# Patient Record
Sex: MALE | Race: WHITE | Employment: FULL TIME | URBAN - METROPOLITAN AREA
[De-identification: names, ages, dates, MRNs, and addresses within clinical notes are randomized per-mention and may not be internally consistent; named-entity substitution may affect disease eponyms.]

---

## 2018-11-18 ENCOUNTER — APPOINTMENT (OUTPATIENT)
Dept: GENERAL RADIOLOGY | Age: 48
End: 2018-11-18
Payer: COMMERCIAL

## 2018-11-18 ENCOUNTER — HOSPITAL ENCOUNTER (EMERGENCY)
Age: 48
Discharge: HOME | End: 2018-11-18
Attending: INTERNAL MEDICINE
Payer: COMMERCIAL

## 2018-11-18 ENCOUNTER — APPOINTMENT (OUTPATIENT)
Dept: ULTRASOUND IMAGING | Age: 48
End: 2018-11-18
Payer: COMMERCIAL

## 2018-11-18 VITALS
HEIGHT: 74 IN | BODY MASS INDEX: 26.31 KG/M2 | TEMPERATURE: 98.6 F | RESPIRATION RATE: 17 BRPM | SYSTOLIC BLOOD PRESSURE: 138 MMHG | DIASTOLIC BLOOD PRESSURE: 83 MMHG | WEIGHT: 205 LBS | OXYGEN SATURATION: 99 % | HEART RATE: 86 BPM

## 2018-11-18 DIAGNOSIS — I10 ESSENTIAL HYPERTENSION: ICD-10-CM

## 2018-11-18 DIAGNOSIS — M25.571 ACUTE RIGHT ANKLE PAIN: Primary | ICD-10-CM

## 2018-11-18 DIAGNOSIS — S91.309A OPEN WOUND OF HEEL, INITIAL ENCOUNTER: ICD-10-CM

## 2018-11-18 LAB — D DIMER: 0.79 MG/L FEU (ref 0–0.5)

## 2018-11-18 PROCEDURE — 93971 EXTREMITY STUDY: CPT

## 2018-11-18 PROCEDURE — 73590 X-RAY EXAM OF LOWER LEG: CPT

## 2018-11-18 PROCEDURE — 6370000000 HC RX 637 (ALT 250 FOR IP): Performed by: INTERNAL MEDICINE

## 2018-11-18 PROCEDURE — 99284 EMERGENCY DEPT VISIT MOD MDM: CPT

## 2018-11-18 PROCEDURE — 73630 X-RAY EXAM OF FOOT: CPT

## 2018-11-18 PROCEDURE — 85379 FIBRIN DEGRADATION QUANT: CPT

## 2018-11-18 RX ORDER — CEPHALEXIN 250 MG/1
250 CAPSULE ORAL 4 TIMES DAILY
Qty: 20 CAPSULE | Refills: 0 | Status: SHIPPED | OUTPATIENT
Start: 2018-11-18 | End: 2018-11-23

## 2018-11-18 RX ORDER — CEPHALEXIN 500 MG/1
500 CAPSULE ORAL ONCE
Status: COMPLETED | OUTPATIENT
Start: 2018-11-18 | End: 2018-11-18

## 2018-11-18 RX ORDER — DIAPER,BRIEF,INFANT-TODD,DISP
EACH MISCELLANEOUS ONCE
Status: COMPLETED | OUTPATIENT
Start: 2018-11-18 | End: 2018-11-18

## 2018-11-18 RX ADMIN — CEPHALEXIN 500 MG: 500 CAPSULE ORAL at 21:47

## 2018-11-18 RX ADMIN — BACITRACIN: 500 OINTMENT TOPICAL at 21:47

## 2018-11-18 ASSESSMENT — PAIN DESCRIPTION - ONSET
ONSET: ON-GOING
ONSET: ON-GOING

## 2018-11-18 ASSESSMENT — PAIN DESCRIPTION - DESCRIPTORS
DESCRIPTORS: ACHING
DESCRIPTORS: ACHING;STABBING;THROBBING

## 2018-11-18 ASSESSMENT — PAIN DESCRIPTION - ORIENTATION: ORIENTATION: RIGHT

## 2018-11-18 ASSESSMENT — PAIN DESCRIPTION - PAIN TYPE
TYPE: ACUTE PAIN
TYPE: SURGICAL PAIN

## 2018-11-18 ASSESSMENT — PAIN DESCRIPTION - FREQUENCY
FREQUENCY: CONTINUOUS
FREQUENCY: CONTINUOUS

## 2018-11-18 ASSESSMENT — PAIN DESCRIPTION - LOCATION
LOCATION: ANKLE;LEG
LOCATION: ANKLE

## 2018-11-18 ASSESSMENT — PAIN - FUNCTIONAL ASSESSMENT: PAIN_FUNCTIONAL_ASSESSMENT: 0-10

## 2018-11-18 ASSESSMENT — PAIN SCALES - GENERAL
PAINLEVEL_OUTOF10: 8
PAINLEVEL_OUTOF10: 3

## 2018-11-18 ASSESSMENT — PAIN DESCRIPTION - PROGRESSION
CLINICAL_PROGRESSION: GRADUALLY WORSENING
CLINICAL_PROGRESSION: GRADUALLY IMPROVING

## 2018-11-19 NOTE — ED PROVIDER NOTES
friction rub. No murmur heard. Pulmonary/Chest: Effort normal and breath sounds are symmetric and normal. No respiratory distress. There are no wheezes, rales or rhonchi. No tenderness is exhibited upon palpation of the chest wall. Abdominal: Soft. Bowel sounds are normal. No distension or no mass exhibitted. There is no tenderness, rebound,rigidity or guarding. Genitourinary:   No CVA tenderness noted on examination. Musculoskeletal: There is a diffuse right ankle tenderness and swelling without erythema. Limited range of motion. Lymphadenopathy:  Nocervical adenopathy. Neurological:   alert and oriented to person, place, and time. Reflexes are normal.  There are no cranial nerve deficits. Normal muscle tone, motor and sensory function exhibitted. Coordination and gait normal.   Skin: Right heel, plantar surface, incision with sutures. No surrounding erythema, edema, tenderness or drainage. Skin is warm and dry. No rash noted. No diaphoresis. No pallor. Psychiatric: Pleasant and cooperative. normal mood and affect. Behavior is  normal. Judgmentand thought content normal.     DIAGNOSTIC RESULTS     EKG: All EKG's are interpreted by the Emergency Department Physician who either signs or Co-signs this chart in the absence of a cardiologist.    Not indicated. RADIOLOGY:   Non-plain film images such as CT, Ultrasoundand MRI are read by the radiologist. Plain radiographic images are visualized and preliminarily interpreted by the emergency physician with the below findings:    Not indicated. Interpretation per the Radiologist below, if available at the time of this note:    XR TIBIA FIBULA RIGHT (2 VIEWS)    (Results Pending)   XR FOOT RIGHT (MIN 3 VIEWS)    (Results Pending)   US DUP LOWER EXTREMITY RIGHT YOLA    (Results Pending)   X-ray of the right foot area and a preliminary report was provided by stat read. There is no acute fracture. Degenerative changes.  S2. Paracervical changes about the ankle.    X-ray right tibia/fib. A preliminary report was provided by stat read. No acute fracture. Section of the distal fibula. Resection with the tibia with attempted effusion about the ankle. Collapsed irregular-appearing talus versus resection. Irregular appearance of the calcaneus. Previous fixation hardware tracks fib and the tibia. Ultrasound venous right lower extremity. Preliminary report was provided by stat read. No DVT demonstrated. ED BEDSIDE ULTRASOUND:   Performed by ED Physician - none    LABS:  Labs Reviewed   D-DIMER, QUANTITATIVE - Abnormal; Notable for the following:        Result Value    D-Dimer, Quant 0.79 (*)     All other components within normal limits    Narrative:     Theresa Shelley tel. [phone],  Coag results called to and read back by sangita lakhani 11/18/2018 20:20, by Griselda Breaker       All other labs were within normal range or not returned as of this dictation. EMERGENCY DEPARTMENT COURSE and DIFFERENTIAL DIAGNOSIS/MDM:   Vitals:    Vitals:    11/18/18 1946 11/18/18 2134   BP: (!) 163/100 138/83   Pulse: 77 86   Resp: 18 17   Temp: 98.1 °F (36.7 °C) 98.6 °F (37 °C)   TempSrc: Oral Oral   SpO2: 100% 99%   Weight: 205 lb (93 kg)    Height: 6' 2\" (1.88 m)        Noted    MDM    CRITICAL CARE TIME   Total Critical Care time was 0 minutes. EDCOURSE       CONSULTS:  None    PROCEDURES:  Unlessotherwise noted below, none     Procedures      Summation    Patient with right ankle pain after feeling a pop. There is no evidence of hardware failure. Concerned for deformity about the talar joint which may be postsurgical. Patient instructed to use crutches for no weight bearing status and to follow-up with his orthopedic doctor.       Patient Course:        ED Medicationsadministered this visit:    Medications   cephALEXin (KEFLEX) capsule 500 mg (500 mg Oral Given 11/18/18 2147)   bacitracin zinc ointment ( Topical Given 11/18/18 2147)       New Prescriptions from this visit: Discharge Medication List as of 11/18/2018  9:41 PM      START taking these medications    Details   cephALEXin (KEFLEX) 250 MG capsule Take 1 capsule by mouth 4 times daily for 5 days, Disp-20 capsule, R-0Print             Follow-up:    ORTHOPEDICS in 1 day            Final Impression:   1. Acute right ankle pain    2. Essential hypertension    3. Open wound of heel, initial encounter               (Please note that portions of this note werecompleted with a voice recognition program.  Efforts were made to edit the dictations but occasionally words are mis-transcribed.)    FINAL IMPRESSION      1. Acute right ankle pain    2. Essential hypertension    3.  Open wound of heel, initial encounter          DISPOSITION/PLAN   DISPOSITION Decision To Discharge 11/18/2018 09:36:42 PM      PATIENT REFERRED TO:    ORTHOPEDICS in 1 day          DISCHARGE MEDICATIONS:  Discharge Medication List as of 11/18/2018  9:41 PM      START taking these medications    Details   cephALEXin (KEFLEX) 250 MG capsule Take 1 capsule by mouth 4 times daily for 5 days, Disp-20 capsule, R-0Print                (Please note that portions of this note were completed with a voice recognition program.  Efforts were made to edit the dictations but occasionally words are mis-transcribed.)    Varun Estes MD (electronically signed)  Attending Emergency Physician            Varun Estes MD  11/19/18 4172

## 2019-04-30 ENCOUNTER — OFFICE VISIT (OUTPATIENT)
Dept: FAMILY MEDICINE CLINIC | Age: 49
End: 2019-04-30
Payer: COMMERCIAL

## 2019-04-30 ENCOUNTER — HOSPITAL ENCOUNTER (OUTPATIENT)
Dept: GENERAL RADIOLOGY | Age: 49
Discharge: HOME | End: 2019-05-02
Payer: COMMERCIAL

## 2019-04-30 ENCOUNTER — HOSPITAL ENCOUNTER (OUTPATIENT)
Age: 49
Discharge: HOME | End: 2019-05-02
Payer: COMMERCIAL

## 2019-04-30 VITALS
BODY MASS INDEX: 26.69 KG/M2 | HEART RATE: 96 BPM | WEIGHT: 208 LBS | DIASTOLIC BLOOD PRESSURE: 86 MMHG | TEMPERATURE: 97 F | OXYGEN SATURATION: 98 % | HEIGHT: 74 IN | SYSTOLIC BLOOD PRESSURE: 136 MMHG | RESPIRATION RATE: 18 BRPM

## 2019-04-30 DIAGNOSIS — R05.3 CHRONIC COUGH: Primary | ICD-10-CM

## 2019-04-30 DIAGNOSIS — R05.3 CHRONIC COUGH: ICD-10-CM

## 2019-04-30 PROCEDURE — 71046 X-RAY EXAM CHEST 2 VIEWS: CPT

## 2019-04-30 PROCEDURE — 99213 OFFICE O/P EST LOW 20 MIN: CPT | Performed by: PHYSICIAN ASSISTANT

## 2019-04-30 RX ORDER — MONTELUKAST SODIUM 10 MG/1
10 TABLET ORAL NIGHTLY
Qty: 30 TABLET | Refills: 2 | Status: SHIPPED | OUTPATIENT
Start: 2019-04-30 | End: 2020-01-22

## 2019-04-30 RX ORDER — BENZONATATE 200 MG/1
200 CAPSULE ORAL 3 TIMES DAILY PRN
Qty: 30 CAPSULE | Refills: 2 | Status: SHIPPED | OUTPATIENT
Start: 2019-04-30 | End: 2019-05-07

## 2019-04-30 ASSESSMENT — PATIENT HEALTH QUESTIONNAIRE - PHQ9
1. LITTLE INTEREST OR PLEASURE IN DOING THINGS: 0
SUM OF ALL RESPONSES TO PHQ QUESTIONS 1-9: 0
SUM OF ALL RESPONSES TO PHQ9 QUESTIONS 1 & 2: 0
2. FEELING DOWN, DEPRESSED OR HOPELESS: 0
SUM OF ALL RESPONSES TO PHQ QUESTIONS 1-9: 0

## 2019-04-30 ASSESSMENT — ENCOUNTER SYMPTOMS
HEMOPTYSIS: 0
WHEEZING: 0
SHORTNESS OF BREATH: 0
HEARTBURN: 0
SORE THROAT: 0
RHINORRHEA: 0
COUGH: 1

## 2019-04-30 NOTE — PROGRESS NOTES
Subjective     D Tonie Boyd [facility] 46 y.o. male presents 4/30/19 with   Chief Complaint   Patient presents with    Cough     C/o cough 9x week. Patient tried cough drops with some relief        Cough   This is a new problem. The current episode started more than 1 month ago (x 9 months). The problem occurs constantly. The cough is non-productive. Pertinent negatives include no chest pain, chills, ear congestion, ear pain, fever, headaches, heartburn, hemoptysis, myalgias, nasal congestion, postnasal drip, rash, rhinorrhea, sore throat, shortness of breath, sweats, weight loss or wheezing. Nothing aggravates the symptoms. Treatments tried: some allergy pills, cough drops. The treatment provided no relief. There is no history of asthma, bronchiectasis, bronchitis, COPD, emphysema, environmental allergies or pneumonia. Cough began after surgery (fractured leg), suspected it was from the intubation but it has been 9 weeks and no improvement. Reviewed thefollowing history:    Past Medical History:   Diagnosis Date    Allergic rhinitis     Cancer ([address] 2/12    prostate    Hyperlipidemia     borderline    PVC (premature ventricular contraction)     benign     Past Surgical History:   Procedure Laterality Date    CYST REMOVAL  2000    buttocks    FINGER SURGERY      left thumb, [employer]ers    KNEE SURGERY      left, meniscal    PROSTATECTOMY  2/12    cancer    TONSILLECTOMY  age 8     Family History   Problem Relation Age of Onset    Cancer Father         prostate    Cancer Paternal Grandfather         prostate       Allergies   Allergen Reactions    Varenicline      Other reaction(s):  Intolerance  PVCs, dysrrhythmia       Current Outpatient Medications   Medication Sig Dispense Refill    montelukast (SINGULAIR) 10 MG tablet Take 1 tablet by mouth nightly 30 tablet 2    benzonatate (TESSALON) 200 MG capsule Take 1 capsule by mouth 3 times daily as needed for Cough 30 capsule 2     No current facility-administered medications for this visit. Review of Systems   Constitutional: Negative for chills, fever and weight loss. HENT: Negative for ear pain, postnasal drip, rhinorrhea and sore throat. Respiratory: Positive for cough. Negative for hemoptysis, shortness of breath and wheezing. Cardiovascular: Negative for chest pain. Gastrointestinal: Negative for heartburn. Musculoskeletal: Negative for myalgias. Skin: Negative for rash. Allergic/Immunologic: Negative for environmental allergies. Neurological: Negative for headaches. Objective    Vitals:    04/30/19 1408   BP: 136/86   Site: Left Upper Arm   Position: Sitting   Cuff Size: Large Adult   Pulse: 96   Resp: 18   Temp: 97 °F (36.1 °C)   TempSrc: Temporal   SpO2: 98%   Weight: 208 lb (94.3 kg)   Height: 6' 2\" (1.88 m)       Physical Exam   Constitutional: He appears well-developed and well-nourished. No distress. HENT:   Right Ear: External ear normal.   Left Ear: External ear normal.   Mouth/Throat: No oropharyngeal exudate. Eyes: No scleral icterus. Cardiovascular: Normal rate and regular rhythm. Pulmonary/Chest: Effort normal and breath sounds normal. No stridor. No respiratory distress. He has no wheezes. He has no rales. He exhibits no tenderness. Lymphadenopathy:     He has no cervical adenopathy. Skin: He is not diaphoretic. No erythema. No pallor. Vitals reviewed. Assessment and Plan      ICD-10-CM    1.  Chronic cough R05 XR CHEST (SINGLE VIEW FRONTAL)     Referral To External ENT Wilber Davis MD       Orders Placed This Encounter   Procedures    XR CHEST (SINGLE VIEW FRONTAL)     Standing Status:   Future     Standing Expiration Date:   4/30/2020     Order Specific Question:   Reason for exam:     Answer:   cough x 9 weeks    Referral To External ENT Wilber Davis MD     Referral Priority:   Routine     Referral Type:   Eval and Treat     Referral Reason:   Specialty Services Required Referred to Provider:   Marco A Zhao MD     Requested Specialty:   Otolaryngology     Number of Visits Requested:   1       Orders Placed This Encounter   Medications    montelukast (SINGULAIR) 10 MG tablet     Sig: Take 1 tablet by mouth nightly     Dispense:  30 tablet     Refill:  2    benzonatate (TESSALON) 200 MG capsule     Sig: Take 1 capsule by mouth 3 times daily as needed for Cough     Dispense:  30 capsule     Refill:  2       Reviewed with the patient: current clinicalstatus, medications, activities and diet. Side effects, adverse effects of the medication prescribed today, as well as treatment plan and result expectations have been discussed with the patient who expressesunderstanding and desires to proceed. Close follow up to evaluate treatment results and for coordination of care. I have reviewed the patient's medical history in detail and updated the computerized patientrecord. Return if symptoms worsen or fail to improve, for follow up with PCP.     VIOLETA Conner

## 2019-06-12 ENCOUNTER — HOSPITAL ENCOUNTER (OUTPATIENT)
Age: 49
Discharge: HOME | End: 2019-06-14
Payer: COMMERCIAL

## 2019-06-12 ENCOUNTER — HOSPITAL ENCOUNTER (OUTPATIENT)
Dept: GENERAL RADIOLOGY | Age: 49
Discharge: HOME | End: 2019-06-14
Payer: COMMERCIAL

## 2019-06-12 DIAGNOSIS — R52 PAIN: ICD-10-CM

## 2019-06-12 DIAGNOSIS — M79.661 PAIN AND SWELLING OF RIGHT LOWER LEG: ICD-10-CM

## 2019-06-12 DIAGNOSIS — M79.89 PAIN AND SWELLING OF RIGHT LOWER LEG: ICD-10-CM

## 2019-06-12 PROCEDURE — 73590 X-RAY EXAM OF LOWER LEG: CPT

## 2019-09-18 ENCOUNTER — HOSPITAL ENCOUNTER (OUTPATIENT)
Dept: ULTRASOUND IMAGING | Age: 49
Discharge: HOME | End: 2019-09-20
Payer: COMMERCIAL

## 2019-09-18 DIAGNOSIS — M79.661 PAIN IN RIGHT LOWER LEG: ICD-10-CM

## 2019-09-18 PROCEDURE — 93926 LOWER EXTREMITY STUDY: CPT

## 2019-10-01 ENCOUNTER — HOSPITAL ENCOUNTER (OUTPATIENT)
Dept: LAB | Age: 49
Discharge: HOME | End: 2019-10-01
Payer: COMMERCIAL

## 2019-10-01 LAB
ANION GAP SERPL CALCULATED.3IONS-SCNC: 14 MEQ/L (ref 9–15)
BASOPHILS ABSOLUTE: 0.1 K/UL (ref 0–0.2)
BASOPHILS RELATIVE PERCENT: 1.2 %
BUN BLDV-MCNC: 13 MG/DL (ref 6–20)
C-REACTIVE PROTEIN: 20.2 MG/L (ref 0–5)
CALCIUM SERPL-MCNC: 9.5 MG/DL (ref 8.5–9.9)
CHLORIDE BLD-SCNC: 103 MEQ/L (ref 95–107)
CO2: 24 MEQ/L (ref 20–31)
CREAT SERPL-MCNC: 1.24 MG/DL (ref 0.7–1.2)
EOSINOPHILS ABSOLUTE: 0.1 K/UL (ref 0–0.7)
EOSINOPHILS RELATIVE PERCENT: 2.9 %
GFR AFRICAN AMERICAN: >60
GFR NON-AFRICAN AMERICAN: >60
GLUCOSE BLD-MCNC: 99 MG/DL (ref 70–99)
HCT VFR BLD CALC: 34.9 % (ref 42–52)
HEMOGLOBIN: 11.1 G/DL (ref 14–18)
LYMPHOCYTES ABSOLUTE: 1.6 K/UL (ref 1–4.8)
LYMPHOCYTES RELATIVE PERCENT: 32.5 %
MCH RBC QN AUTO: 26 PG (ref 27–31.3)
MCHC RBC AUTO-ENTMCNC: 31.9 % (ref 33–37)
MCV RBC AUTO: 81.5 FL (ref 80–100)
MONOCYTES ABSOLUTE: 0.6 K/UL (ref 0.2–0.8)
MONOCYTES RELATIVE PERCENT: 11.6 %
NEUTROPHILS ABSOLUTE: 2.5 K/UL (ref 1.4–6.5)
NEUTROPHILS RELATIVE PERCENT: 51.8 %
PDW BLD-RTO: 15.6 % (ref 11.5–14.5)
PLATELET # BLD: 466 K/UL (ref 130–400)
POTASSIUM SERPL-SCNC: 4.3 MEQ/L (ref 3.4–4.9)
RBC # BLD: 4.28 M/UL (ref 4.7–6.1)
SEDIMENTATION RATE, ERYTHROCYTE: 86 MM (ref 0–20)
SODIUM BLD-SCNC: 141 MEQ/L (ref 135–144)
WBC # BLD: 4.8 K/UL (ref 4.8–10.8)

## 2019-10-01 PROCEDURE — 80048 BASIC METABOLIC PNL TOTAL CA: CPT

## 2019-10-01 PROCEDURE — 85025 COMPLETE CBC W/AUTO DIFF WBC: CPT

## 2019-10-01 PROCEDURE — 85652 RBC SED RATE AUTOMATED: CPT

## 2019-10-01 PROCEDURE — 86140 C-REACTIVE PROTEIN: CPT

## 2019-10-01 PROCEDURE — 36415 COLL VENOUS BLD VENIPUNCTURE: CPT

## 2020-01-10 ENCOUNTER — HOSPITAL ENCOUNTER (OUTPATIENT)
Dept: LAB | Age: 50
Discharge: HOME | End: 2020-01-10
Payer: COMMERCIAL

## 2020-01-10 ENCOUNTER — HOSPITAL ENCOUNTER (OUTPATIENT)
Dept: WOUND CARE | Age: 50
Discharge: HOME | End: 2020-01-10
Payer: COMMERCIAL

## 2020-01-10 VITALS
RESPIRATION RATE: 16 BRPM | SYSTOLIC BLOOD PRESSURE: 123 MMHG | DIASTOLIC BLOOD PRESSURE: 83 MMHG | TEMPERATURE: 98 F | HEART RATE: 75 BPM

## 2020-01-10 PROBLEM — T81.329A DEHISCENCE OF INTERNAL SURGICAL INCISION, INITIAL ENCOUNTER: Status: ACTIVE | Noted: 2020-01-10

## 2020-01-10 PROBLEM — L02.419 CUTANEOUS ABSCESS OF EXTREMITY: Chronic | Status: ACTIVE | Noted: 2020-01-10

## 2020-01-10 PROBLEM — T81.32XA DEHISCENCE OF INTERNAL SURGICAL INCISION, INITIAL ENCOUNTER: Status: ACTIVE | Noted: 2020-01-10

## 2020-01-10 LAB
BASOPHILS ABSOLUTE: 0 K/UL (ref 0–0.2)
BASOPHILS RELATIVE PERCENT: 0.6 %
C-REACTIVE PROTEIN, HIGH SENSITIVITY: 37.7 MG/L (ref 0–5)
EOSINOPHILS ABSOLUTE: 0.2 K/UL (ref 0–0.7)
EOSINOPHILS RELATIVE PERCENT: 4 %
HCT VFR BLD CALC: 37.3 % (ref 42–52)
HEMOGLOBIN: 12.2 G/DL (ref 14–18)
LYMPHOCYTES ABSOLUTE: 1.6 K/UL (ref 1–4.8)
LYMPHOCYTES RELATIVE PERCENT: 36.2 %
MCH RBC QN AUTO: 26.9 PG (ref 27–31.3)
MCHC RBC AUTO-ENTMCNC: 32.7 % (ref 33–37)
MCV RBC AUTO: 82.1 FL (ref 80–100)
MONOCYTES ABSOLUTE: 0.3 K/UL (ref 0.2–0.8)
MONOCYTES RELATIVE PERCENT: 7.7 %
NEUTROPHILS ABSOLUTE: 2.3 K/UL (ref 1.4–6.5)
NEUTROPHILS RELATIVE PERCENT: 51.5 %
PDW BLD-RTO: 17.5 % (ref 11.5–14.5)
PLATELET # BLD: 494 K/UL (ref 130–400)
RBC # BLD: 4.55 M/UL (ref 4.7–6.1)
SEDIMENTATION RATE, ERYTHROCYTE: 85 MM (ref 0–20)
VITAMIN D 25-HYDROXY: 38.7 NG/ML (ref 30–100)
WBC # BLD: 4.5 K/UL (ref 4.8–10.8)

## 2020-01-10 PROCEDURE — 85652 RBC SED RATE AUTOMATED: CPT

## 2020-01-10 PROCEDURE — 36415 COLL VENOUS BLD VENIPUNCTURE: CPT

## 2020-01-10 PROCEDURE — 86141 C-REACTIVE PROTEIN HS: CPT

## 2020-01-10 PROCEDURE — 99212 OFFICE O/P EST SF 10 MIN: CPT

## 2020-01-10 PROCEDURE — 82306 VITAMIN D 25 HYDROXY: CPT

## 2020-01-10 PROCEDURE — 10060 I&D ABSCESS SIMPLE/SINGLE: CPT

## 2020-01-10 PROCEDURE — 85025 COMPLETE CBC W/AUTO DIFF WBC: CPT

## 2020-01-10 NOTE — PROGRESS NOTES
to Encounter   Medication Sig Dispense Refill    montelukast (SINGULAIR) 10 MG tablet Take 1 tablet by mouth nightly 30 tablet 2     No current facility-administered medications on file prior to encounter. REVIEW OF SYSTEMS    Pertinent items are noted in HPI. Objective:      /83   Pulse 75   Temp 98 °F (36.7 °C) (Temporal)   Resp 16     Wt Readings from Last 3 Encounters:   04/30/19 208 lb (94.3 kg)   11/18/18 205 lb (93 kg)   01/27/16 195 lb (88.5 kg)       PHYSICAL EXAM    Constitutional:   Well nourished and well developed. Appears neat and clean. Patient is alert, oriented x3, and in no apparent distress. Respiratory:  Respiratory effort is easy and symmetric bilaterally. Rate is normal at rest and on room air. Vascular:  Pedal Pulses is palpable and audible signal noted with doppler. Capillary refill is <5 sec to digits bilateral.  Extremities negative for pitting edema. Neurological:  Gross and Light touch intact. Protective sensation intact    Dermatological:  Wound description noted in wound assessment. Medial ankle patient has a fluctuant bulla with bloody purulent drainage noted. The medial wound tunnels approximately 2 centimeters. No draiange noted from this    Psychiatric:  Judgement and insight intact. Short and long term memory intact. No evidence of depression, anxiety, or agitation. Patient is calm, cooperative, and communicative. Appropriate interactions and affect.       Assessment:      Problem List Items Addressed This Visit     Cutaneous abscess of extremity (Chronic)    Dehiscence of internal surgical incision, initial encounter      Other Visit Diagnoses     Other acute osteomyelitis of right ankle ([facility] [address]    -  Primary    Relevant Orders    CBC auto differential    Sedimentation Rate    High Sensitivity Crp           Procedure Note  Indications:  Based on my examination of this patient's wound(s)/ulcer(s) today, debridement is required to promote healing and evaluate the wound base. Wound 01/10/20 Ankle Right;Lateral #1 (Active)   Wound Image   1/10/2020 11:13 AM   Wound Non-Healing Surgical 1/10/2020 11:13 AM   Offloading for Diabetic Foot Ulcers Offloading boot 1/10/2020 12:06 PM   Wound Cleansed Rinsed/Irrigated with saline 1/10/2020 11:13 AM   Wound Length (cm) 0.9 cm 1/10/2020 11:13 AM   Wound Width (cm) 0.9 cm 1/10/2020 11:13 AM   Wound Depth (cm) 1.5 cm 1/10/2020 11:13 AM   Wound Surface Area (cm^2) 0.81 cm^2 1/10/2020 11:13 AM   Wound Volume (cm^3) 1.22 cm^3 1/10/2020 11:13 AM   Post-Procedure Length (cm) 0.9 cm 1/10/2020 11:40 AM   Post-Procedure Width (cm) 0.9 cm 1/10/2020 11:40 AM   Post-Procedure Depth (cm) 2 cm 1/10/2020 11:40 AM   Post-Procedure Surface Area (cm^2) 0.81 cm^2 1/10/2020 11:40 AM   Post-Procedure Volume (cm^3) 1.62 cm^3 1/10/2020 11:40 AM   Drainage Amount Moderate 1/10/2020 11:40 AM   Drainage Description Serosanguinous 1/10/2020 11:40 AM   Odor None 1/10/2020 11:13 AM   Margins Defined edges 1/10/2020 11:13 AM   Maria Esther-wound Assessment Dry; Intact 1/10/2020 11:13 AM   Non-staged Wound Description Full thickness 1/10/2020 11:13 AM   Red%Wound Bed 35 1/10/2020 11:13 AM   Yellow%Wound Bed 65 1/10/2020 11:13 AM   Number of days: 0       Wound 01/10/20 Ankle Right;Medial #2 (Active)   Wound Image   1/10/2020 11:13 AM   Wound Other 1/10/2020 11:13 AM   Wound Cleansed Rinsed/Irrigated with saline 1/10/2020 11:13 AM   Post-Procedure Length (cm) 3 cm 1/10/2020 11:40 AM   Post-Procedure Width (cm) 1.2 cm 1/10/2020 11:40 AM   Post-Procedure Depth (cm) 1.5 cm 1/10/2020 11:40 AM   Post-Procedure Surface Area (cm^2) 3.6 cm^2 1/10/2020 11:40 AM   Post-Procedure Volume (cm^3) 5.4 cm^3 1/10/2020 11:40 AM   Drainage Amount Moderate 1/10/2020 11:40 AM   Drainage Description Serosanguinous;Purulent 1/10/2020 11:40 AM   Odor None 1/10/2020 11:13 AM   Margins Defined edges 1/10/2020 11:13 AM   Maria Esther-wound Assessment Dry; Intact 1/10/2020 11:13 AM   Non-staged Wound Description Full thickness 1/10/2020 11:13 AM   [place]%Wound Bed 5 1/10/2020 11:13 AM   Yellow%Wound Bed 25 1/10/2020 11:13 AM   Purple%Wound Bed 70 1/10/2020 11:13 AM   Number of days: 0         Incision and Drainage Note    Type of Incision and Drainage:      Hematoma/Seroma    Performed by: Angelina Macias DPM    Consent obtained: Yes    Time out taken: Yes    Pain Control:       Drainage Type: moderate, thick, bloody, purulent    Instruments: tissue nippers    The wound/ulcer was sharply debrided    down through and included excision of  epidermis, dermis and subcutaneous tissue. Devitalized Tissue Debrided:  necrotic/eschar    Location of Incision and Drainage:    Wound/Ulcer #: 1    Incision and Drainage Size cm  Wound 01/10/20 Ankle Right;Lateral #1 (Active)   Wound Image   1/10/2020 11:13 AM   Wound Non-Healing Surgical 1/10/2020 11:13 AM   Offloading for Diabetic Foot Ulcers Offloading boot 1/10/2020 12:06 PM   Wound Cleansed Rinsed/Irrigated with saline 1/10/2020 11:13 AM   Wound Length (cm) 0.9 cm 1/10/2020 11:13 AM   Wound Width (cm) 0.9 cm 1/10/2020 11:13 AM   Wound Depth (cm) 1.5 cm 1/10/2020 11:13 AM   Wound Surface Area (cm^2) 0.81 cm^2 1/10/2020 11:13 AM   Wound Volume (cm^3) 1.22 cm^3 1/10/2020 11:13 AM   Post-Procedure Length (cm) 0.9 cm 1/10/2020 11:40 AM   Post-Procedure Width (cm) 0.9 cm 1/10/2020 11:40 AM   Post-Procedure Depth (cm) 2 cm 1/10/2020 11:40 AM   Post-Procedure Surface Area (cm^2) 0.81 cm^2 1/10/2020 11:40 AM   Post-Procedure Volume (cm^3) 1.62 cm^3 1/10/2020 11:40 AM   Drainage Amount Moderate 1/10/2020 11:40 AM   Drainage Description Serosanguinous 1/10/2020 11:40 AM   Odor None 1/10/2020 11:13 AM   Margins Defined edges 1/10/2020 11:13 AM   Maria Esther-wound Assessment Dry; Intact 1/10/2020 11:13 AM   Non-staged Wound Description Full thickness 1/10/2020 11:13 AM   Red%Wound Bed 35 1/10/2020 11:13 AM   Yellow%Wound Bed 65 1/10/2020 11:13 AM   Number of days: 0       Wound 01/10/20 Ankle Right;Medial #2 (Active)   Wound Image   1/10/2020 11:13 AM   Wound Other 1/10/2020 11:13 AM   Wound Cleansed Rinsed/Irrigated with saline 1/10/2020 11:13 AM   Post-Procedure Length (cm) 3 cm 1/10/2020 11:40 AM   Post-Procedure Width (cm) 1.2 cm 1/10/2020 11:40 AM   Post-Procedure Depth (cm) 1.5 cm 1/10/2020 11:40 AM   Post-Procedure Surface Area (cm^2) 3.6 cm^2 1/10/2020 11:40 AM   Post-Procedure Volume (cm^3) 5.4 cm^3 1/10/2020 11:40 AM   Drainage Amount Moderate 1/10/2020 11:40 AM   Drainage Description Serosanguinous;Purulent 1/10/2020 11:40 AM   Odor None 1/10/2020 11:13 AM   Margins Defined edges 1/10/2020 11:13 AM   Maria Esther-wound Assessment Dry; Intact 1/10/2020 11:13 AM   Non-staged Wound Description Full thickness 1/10/2020 11:13 AM   [place]%Wound Bed 5 1/10/2020 11:13 AM   Yellow%Wound Bed 25 1/10/2020 11:13 AM   Purple%Wound Bed 70 1/10/2020 11:13 AM   Number of days: 0          Wound size pre: 3.6    Wound size post: 3.6     Culture sent: Yes     Bleeding:with a small amount of bleeding    Hemostasis Achieved: by pressure    Procedural Pain: 2  / 10     Post Procedural Pain: 4 / 10     Response to treatment: Well tolerated by patient. Plan:   Patient examined  I&D of absecess performed  Opticell AG rope packing  Blood work CBC VIT D ESR CRP  Follow up in one week  Culture taken  Discussed possible  IV abx        Treatment Note please see attached Discharge Instructions    Written patient dismissal instructions given to patient and signed by patient or POA.          Discharge [address] and [facility]   Physician Orders and Discharge Instructions  [address]  Telephone: [phone]      [phone]      NAME:  JOVANA Morales  YOB: 1970  MEDICAL RECORD NUMBER:  [id number]    Home Care/Facility:   NONE    Wound Location:   RIGHT LATERAL ANKLE AND RIGHT

## 2020-01-14 RX ORDER — CEPHALEXIN 500 MG/1
500 CAPSULE ORAL 4 TIMES DAILY
Qty: 40 CAPSULE | Refills: 0 | Status: SHIPPED | OUTPATIENT
Start: 2020-01-14 | End: 2020-01-24

## 2020-01-17 ENCOUNTER — HOSPITAL ENCOUNTER (OUTPATIENT)
Dept: WOUND CARE | Age: 50
Discharge: HOME | End: 2020-01-17
Payer: COMMERCIAL

## 2020-01-17 VITALS
RESPIRATION RATE: 16 BRPM | DIASTOLIC BLOOD PRESSURE: 84 MMHG | BODY MASS INDEX: 27.6 KG/M2 | TEMPERATURE: 97.7 F | WEIGHT: 215 LBS | HEART RATE: 84 BPM | SYSTOLIC BLOOD PRESSURE: 123 MMHG

## 2020-01-17 PROCEDURE — 99213 OFFICE O/P EST LOW 20 MIN: CPT

## 2020-01-17 ASSESSMENT — PAIN DESCRIPTION - PAIN TYPE: TYPE: CHRONIC PAIN

## 2020-01-17 ASSESSMENT — PAIN DESCRIPTION - FREQUENCY: FREQUENCY: CONTINUOUS

## 2020-01-17 ASSESSMENT — PAIN DESCRIPTION - PROGRESSION: CLINICAL_PROGRESSION: NOT CHANGED

## 2020-01-17 ASSESSMENT — PAIN DESCRIPTION - ORIENTATION: ORIENTATION: RIGHT

## 2020-01-17 ASSESSMENT — PAIN DESCRIPTION - LOCATION: LOCATION: ANKLE

## 2020-01-17 ASSESSMENT — PAIN DESCRIPTION - DESCRIPTORS: DESCRIPTORS: DULL

## 2020-01-17 ASSESSMENT — PAIN SCALES - GENERAL: PAINLEVEL_OUTOF10: 6

## 2020-01-17 ASSESSMENT — PAIN - FUNCTIONAL ASSESSMENT: PAIN_FUNCTIONAL_ASSESSMENT: ACTIVITIES ARE NOT PREVENTED

## 2020-01-17 ASSESSMENT — PAIN DESCRIPTION - ONSET: ONSET: ON-GOING

## 2020-01-17 NOTE — PROGRESS NOTES
MEDICATIONS    Current Outpatient Medications on File Prior to Encounter   Medication Sig Dispense Refill    cephALEXin (KEFLEX) 500 MG capsule Take 1 capsule by mouth 4 times daily 40 capsule 0    SODIUM CHLORIDE, EXTERNAL, 0.9 % SOLN Apply 1 Applicatorful topically daily 1000 mL 2    montelukast (SINGULAIR) 10 MG tablet Take 1 tablet by mouth nightly 30 tablet 2     No current facility-administered medications on file prior to encounter. REVIEW OF SYSTEMS    Pertinent items are noted in HPI. Objective:      /84   Pulse 84   Temp 97.7 °F (36.5 °C) (Temporal)   Resp 16   Wt 215 lb (97.5 kg)   BMI 27.60 kg/m²     Wt Readings from Last 3 Encounters:   01/17/20 215 lb (97.5 kg)   04/30/19 208 lb (94.3 kg)   11/18/18 205 lb (93 kg)       PHYSICAL EXAM    Constitutional:   Well nourished and well developed. Appears neat and clean. Patient is alert, oriented x3, and in no apparent distress. Respiratory:  Respiratory effort is easy and symmetric bilaterally. Rate is normal at rest and on room air. Vascular:  Pedal Pulses is palpable and audible signal noted with doppler. Capillary refill is <5 sec to digits bilateral.  Extremities negative for pitting edema. Neurological:  Gross and Light touch intact. Protective sensation intact    Dermatological:  Wound description noted in wound assessment. Medial ankle The medial wound is improved with healthy granular base and decreased depth as well. No purulenced noted. Edema warmth and redness is improved. Latera wound tunnels approximately 0.7 centimeters. No draiange noted from this area. Psychiatric:  Judgement and insight intact. Short and long term memory intact. No evidence of depression, anxiety, or agitation. Patient is calm, cooperative, and communicative. Appropriate interactions and affect.       Assessment:      Problem List Items Addressed This Visit     None           Procedure Note  Indications:  Based on my Cover with 4x4's and wrap with gauze (francisco j or kerlix)  4. Change EVERY DAY.     Compression:     Offloading Device:   PATIENT HAS AN OFFLOADING BOOT TO WEAR ON RIGHT LOWER LEG.    Other Instructions:  Keep appointment with Infectious Disease.     Keep all dressings clean, dry and intact. Keep pressure off the wound(s) at all times.      Follow up visit   1 Week  January 24, 2020 at  8:45am     Please give 24 hour notice if unable to keep appointment. [phone]     If you experience any of the following, please call the Wound Care Service at  [phone] or go to the nearest emergency room. *Increase in pain         *Temperature over 101           *Increase in drainage from your wound or a foul odor  *Uncontrolled swelling            *Need for compression bandage changes due to slippage, breakthrough drainage       PLEASE NOTE: IF YOU ARE UNABLE TO OBTAIN WOUND SUPPLIES, CONTINUE TO USE THE SUPPLIES YOU HAVE AVAILABLE UNTIL YOU ARE ABLE TO REACH US.  IT IS MOST IMPORTANT TO KEEP THE WOUND COVERED AT ALL TIMES  Electronically signed by Ayana Ware DPM on 1/17/2020 at 9:47 AM          Electronically signed by Ayana Ware DPM on 1/17/2020 at 10:00 AM

## 2020-01-17 NOTE — CODE DOCUMENTATION
[id number] [facility] Phys[facility] Billing Sheet. JOVANA Melendez  AGE: 44 y.o.    GENDER: male  : 1970  TODAY'S DATE:  2020    ICD-10 CODES  Active Hospital Problems    Diagnosis Date Noted    Dehiscence of internal surgical incision, initial encounter Jasson Sadia 01/10/2020    Cutaneous abscess of extremity [L02.419] 01/10/2020       PHYSICIAN PROCEDURES    CPT CODE  06279      Electronically signed by Kimo Mi DPM on 2020 at 10:03 AM

## 2020-01-22 ENCOUNTER — OFFICE VISIT (OUTPATIENT)
Dept: INFECTIOUS DISEASES | Age: 50
End: 2020-01-22
Payer: COMMERCIAL

## 2020-01-22 VITALS
HEART RATE: 96 BPM | TEMPERATURE: 98.2 F | BODY MASS INDEX: 27.08 KG/M2 | DIASTOLIC BLOOD PRESSURE: 87 MMHG | RESPIRATION RATE: 18 BRPM | HEIGHT: 74 IN | SYSTOLIC BLOOD PRESSURE: 117 MMHG | WEIGHT: 211 LBS

## 2020-01-22 PROCEDURE — G8484 FLU IMMUNIZE NO ADMIN: HCPCS | Performed by: INTERNAL MEDICINE

## 2020-01-22 PROCEDURE — G8419 CALC BMI OUT NRM PARAM NOF/U: HCPCS | Performed by: INTERNAL MEDICINE

## 2020-01-22 PROCEDURE — 1036F TOBACCO NON-USER: CPT | Performed by: INTERNAL MEDICINE

## 2020-01-22 PROCEDURE — 3017F COLORECTAL CA SCREEN DOC REV: CPT | Performed by: INTERNAL MEDICINE

## 2020-01-22 PROCEDURE — 99204 OFFICE O/P NEW MOD 45 MIN: CPT | Performed by: INTERNAL MEDICINE

## 2020-01-22 PROCEDURE — G8427 DOCREV CUR MEDS BY ELIG CLIN: HCPCS | Performed by: INTERNAL MEDICINE

## 2020-01-22 NOTE — PROGRESS NOTES
Aissatou Carmichael  1970  male  Medical Record Number: [id number]    Patient informed that I am an Infectious Disease physician and permission obtained from the patient to speak in front of any visitors prior to any discussion for HIPPA purposes. HPI: 44 y.o. male who presents today for wound/ulcer evaluation. He has had a non-healing surgical incision of the right medial and lateral ankle s/p several surgeries to the right leg with IM nailing and attempted ankle fusion which also failed. There is still a sinus tract that is dripping some serosanguinous fluid medial malleolus and possibly goes through and through. The areas are  fluctuant but I am unable to get a good repeat culture. Prior cultures with peptostreptococcus and MSSA. Discussed with podiatry. Subjectively, no new complaints at this time.      Review of Systems: All 14 review of systems were discussed with the patient and are negative other than as stated above      Past Medical History:   Diagnosis Date    Allergic rhinitis     Cancer ([facility] [address] 2/12    prostate    Hyperlipidemia     borderline    PVC (premature ventricular contraction)     benign       Past Surgical History:   Procedure Laterality Date    CYST REMOVAL  2000    buttocks    FINGER SURGERY      left thumb, game keepers    KNEE SURGERY      left, meniscal x 2     PROSTATECTOMY  2/12    cancer    TONSILLECTOMY  age 8       Social History     Socioeconomic History    Marital status:      Spouse name: Not on file    Number of children: Not on file    Years of education: Not on file    Highest education level: Not on file   Occupational History    Not on file   Social Needs    Financial resource strain: Not on file    Food insecurity:     Worry: Not on file     Inability: Not on file    Transportation needs:     Medical: Not on file     Non-medical: Not on file   Tobacco Use    Smoking status: Never Smoker    Smokeless tobacco: Former User   Substance and Sexual Activity    Alcohol use: Yes     Comment: one daily    Drug use: No    Sexual activity: Yes     Partners: Female   Lifestyle    Physical activity:     Days per week: Not on file     Minutes per session: Not on file    Stress: Not on file   Relationships    Social connections:     Talks on phone: Not on file     Gets together: Not on file     Attends Mosque service: Not on file     Active member of club or organization: Not on file     Attends meetings of clubs or organizations: Not on file     Relationship status: Not on file    Intimate partner violence:     Fear of current or ex partner: Not on file     Emotionally abused: Not on file     Physically abused: Not on file     Forced sexual activity: Not on file   Other Topics Concern    Not on file   Social History Narrative    Not on file       Family History   Problem Relation Age of Onset    No Known Problems Mother         no FDR with aneurysms or valvae problems     Cancer Father         prostate    Cancer Paternal Grandfather         prostate    No Known Problems Sister        No current outpatient medications on file prior to visit. No current facility-administered medications on file prior to visit. Physical Exam:    General: Patient appears in no acute distress, cooperative  Skin: no new rashes or erythema or induration  HEENT: EOMI, MMM, Neck is supple  Heart: S1 S2  Lungs: bilaterally clear to auscultation  Abdomen: soft, ND, NTTP, +BS  Extrem:+pulses, no calf pain, right leg and ankle area as above. No surrounding erythema or cellulitis. No foul smell. No real pain to palpation. Neuro exam: CN II-XII intact, facial expressions symmertrical bilaterally, no slurred speech      Labs: I have reviewed all lab results by electronic record, including most recent CBC, metabolic panel, and pertinent abnormalities were addressed from an infectious disease perspective. WBC trends are being monitored.     Lab Results   Component Value Date     01/25/2020    K 4.3 01/25/2020     01/25/2020    CO2 22 01/25/2020    BUN 15 01/25/2020    CREATININE 1.02 01/25/2020    GLUCOSE 100 01/25/2020    CALCIUM 9.1 01/25/2020      Lab Results   Component Value Date    WBC 3.5 (L) 01/25/2020    HGB 12.4 (L) 01/25/2020    HCT 37.8 (L) 01/25/2020    MCV 82.9 01/25/2020     01/25/2020       Radiology:   I have reviewed imaging results per electronic record and most pertinent abnormalities are being addressed from an infectious disease standpoint. Assessment:  PJI, possible OM RLE  Retained orthopedic hardware  MSSA and Peptostreptococcus infection    Plan:  Discuss with patient's ortho and podiatry  Salvage treatment to the best of our ability to maintain functionality - there is likely hardware infection but due to the extensive nature of his issues with multiple surgeries and retained hardware, would try to salvage as best as possible. Dav Yoder for now. Plan on extended treatment for 8 weeks. Follow up in 4 weeks. Weekly CRP to monitor trend. No history of MRSA infections.      Kim Lanier D.O.

## 2020-01-23 ENCOUNTER — TELEPHONE (OUTPATIENT)
Dept: INFECTIOUS DISEASES | Age: 50
End: 2020-01-23

## 2020-01-23 NOTE — TELEPHONE ENCOUNTER
Called recron earlier today by Intake Dept with Eligibility and Cost of Home Infusion Abx+ Supplies. Patient must meet an o/o/p deduct than 15% of cost will be his responsibility. She states $861.00 to meet deduct, and than $129/week for Meds and supplies. Relayed information to patient, advised his appointment for the [address] [place] [address] is for tomorrow, 1-24-20 after [address] [place] [address],[address] visit. Than First dose after. Advised of cost of Home Infusion, and perhaps the option to go daily at the [facility]P Infusion. Unknown the difference in cost if Tx done at O/P. Patient verbalized understanding, is willing to go for the Picc Line and would call his insurance company for more information.

## 2020-01-24 ENCOUNTER — HOSPITAL ENCOUNTER (OUTPATIENT)
Dept: INTERVENTIONAL RADIOLOGY/VASCULAR | Age: 50
Discharge: HOME | End: 2020-01-26
Payer: COMMERCIAL

## 2020-01-24 ENCOUNTER — HOSPITAL ENCOUNTER (OUTPATIENT)
Dept: WOUND CARE | Age: 50
Discharge: HOME | End: 2020-01-24
Payer: COMMERCIAL

## 2020-01-24 ENCOUNTER — HOSPITAL ENCOUNTER (OUTPATIENT)
Dept: INFUSION THERAPY | Age: 50
Setting detail: INFUSION SERIES
Discharge: HOME | End: 2020-01-24
Payer: COMMERCIAL

## 2020-01-24 VITALS
SYSTOLIC BLOOD PRESSURE: 134 MMHG | HEART RATE: 65 BPM | RESPIRATION RATE: 18 BRPM | DIASTOLIC BLOOD PRESSURE: 88 MMHG | TEMPERATURE: 98.1 F

## 2020-01-24 VITALS
TEMPERATURE: 98.2 F | WEIGHT: 211 LBS | HEART RATE: 93 BPM | HEIGHT: 74 IN | BODY MASS INDEX: 27.08 KG/M2 | DIASTOLIC BLOOD PRESSURE: 79 MMHG | SYSTOLIC BLOOD PRESSURE: 144 MMHG | RESPIRATION RATE: 18 BRPM

## 2020-01-24 DIAGNOSIS — M86.071 ACUTE HEMATOGENOUS OSTEOMYELITIS OF RIGHT ANKLE (HCC): Primary | ICD-10-CM

## 2020-01-24 PROBLEM — M86.00 ACUTE HEMATOGENOUS OSTEOMYELITIS (HCC): Chronic | Status: ACTIVE | Noted: 2020-01-24

## 2020-01-24 PROCEDURE — 6360000002 HC RX W HCPCS: Performed by: INTERNAL MEDICINE

## 2020-01-24 PROCEDURE — 99213 OFFICE O/P EST LOW 20 MIN: CPT

## 2020-01-24 PROCEDURE — 2580000003 HC RX 258: Performed by: INTERNAL MEDICINE

## 2020-01-24 PROCEDURE — 96365 THER/PROPH/DIAG IV INF INIT: CPT

## 2020-01-24 PROCEDURE — 36573 INSJ PICC RS&I 5 YR+: CPT | Performed by: RADIOLOGY

## 2020-01-24 PROCEDURE — 2709999900 IR PICC WO SQ PORT/PUMP > 5 YEARS

## 2020-01-24 PROCEDURE — 2500000003 HC RX 250 WO HCPCS: Performed by: INTERNAL MEDICINE

## 2020-01-24 RX ORDER — SODIUM CHLORIDE 9 MG/ML
250 INJECTION, SOLUTION INTRAVENOUS ONCE
Status: COMPLETED | OUTPATIENT
Start: 2020-01-24 | End: 2020-01-24

## 2020-01-24 RX ORDER — SODIUM CHLORIDE 0.9 % (FLUSH) 0.9 %
10 SYRINGE (ML) INJECTION EVERY 12 HOURS SCHEDULED
Status: DISCONTINUED | OUTPATIENT
Start: 2020-01-24 | End: 2020-01-27 | Stop reason: HOSPADM

## 2020-01-24 RX ORDER — SODIUM CHLORIDE 0.9 % (FLUSH) 0.9 %
10 SYRINGE (ML) INJECTION PRN
Status: DISCONTINUED | OUTPATIENT
Start: 2020-01-24 | End: 2020-01-27 | Stop reason: HOSPADM

## 2020-01-24 RX ORDER — LIDOCAINE HYDROCHLORIDE 20 MG/ML
5 INJECTION, SOLUTION INFILTRATION; PERINEURAL ONCE
Status: COMPLETED | OUTPATIENT
Start: 2020-01-24 | End: 2020-01-24

## 2020-01-24 RX ADMIN — SODIUM CHLORIDE 250 ML: 9 INJECTION, SOLUTION INTRAVENOUS at 11:15

## 2020-01-24 RX ADMIN — SODIUM CHLORIDE 1000 MG: 900 INJECTION INTRAVENOUS at 12:12

## 2020-01-24 RX ADMIN — LIDOCAINE HYDROCHLORIDE 5 ML: 20 INJECTION, SOLUTION INFILTRATION; PERINEURAL at 11:14

## 2020-01-24 ASSESSMENT — PAIN SCALES - GENERAL
PAINLEVEL_OUTOF10: 0
PAINLEVEL_OUTOF10: 5

## 2020-01-24 ASSESSMENT — PAIN DESCRIPTION - ORIENTATION: ORIENTATION: RIGHT

## 2020-01-24 ASSESSMENT — PAIN DESCRIPTION - FREQUENCY: FREQUENCY: INTERMITTENT

## 2020-01-24 ASSESSMENT — PAIN DESCRIPTION - PAIN TYPE: TYPE: CHRONIC PAIN

## 2020-01-24 ASSESSMENT — PAIN DESCRIPTION - DESCRIPTORS: DESCRIPTORS: ACHING

## 2020-01-24 ASSESSMENT — PAIN DESCRIPTION - LOCATION: LOCATION: ANKLE

## 2020-01-24 NOTE — PROGRESS NOTES
[facility]   Progress Note and Procedure Note      Jakub Oneil New  MEDICAL RECORD NUMBER:  [id number]  AGE: 44 y.o. GENDER: male  : 1970  EPISODE DATE:  2020    Subjective:     Chief Complaint   Patient presents with    Wound Check     right ankle         HISTORY of PRESENT ILLNESS HPI     JVOANA Andrews is a 44 y.o. male who presents today for wound/ulcer evaluation. History of Wound Context: Non-healing surgical incision of the right medial and lateral ankle Refferred to us by Dr. Saranya Vale. Patient has had several surgeries off his rght leg with IM nailing and attempted ankle fusion which failed. He saw ID who recommends trying IV abx before fixation removal  Wound/Ulcer Pain Timing/Severity: intermittent  Quality of pain: aching  Severity:  3 / 10   Modifying Factors: Pain worsens with walking  Associated Signs/Symptoms: edema, drainage and pain    Ulcer Identification:  Ulcer Type: non-healing surgical  Contributing Factors: edema    Wound: Nonhealing surgical due to infection        PAST MEDICAL HISTORY        Diagnosis Date    Allergic rhinitis     Cancer ([address]     prostate    Hyperlipidemia     borderline    PVC (premature ventricular contraction)     benign       PAST SURGICAL HISTORY    Past Surgical History:   Procedure Laterality Date    CYST REMOVAL      buttocks    FINGER SURGERY      left thumb, game keepers    KNEE SURGERY      left, meniscal    PROSTATECTOMY      cancer    TONSILLECTOMY  age 8       FAMILY HISTORY    Family History   Problem Relation Age of Onset    Cancer Father         prostate    Cancer Paternal Grandfather         prostate       SOCIAL HISTORY    Social History     Tobacco Use    Smoking status: Never Smoker    Smokeless tobacco: Former User   Substance Use Topics    Alcohol use: Yes     Comment: one daily    Drug use: No       ALLERGIES    Allergies   Allergen Reactions    Varenicline      Other reaction(s):  Intolerance  PVCs, dysrrhythmia    Vicodin [Hydrocodone-Acetaminophen] Other (See Comments)     SHAKES       MEDICATIONS    No current outpatient medications on file prior to encounter. No current facility-administered medications on file prior to encounter. REVIEW OF SYSTEMS    Pertinent items are noted in HPI. Objective:      BP (!) 144/79   Pulse 93   Temp 98.2 °F (36.8 °C) (Temporal)   Resp 18   Ht 6' 2\" (1.88 m)   Wt 211 lb (95.7 kg)   BMI 27.09 kg/m²     Wt Readings from Last 3 Encounters:   01/24/20 211 lb (95.7 kg)   01/22/20 211 lb (95.7 kg)   01/17/20 215 lb (97.5 kg)       PHYSICAL EXAM    Constitutional:   Well nourished and well developed. Appears neat and clean. Patient is alert, oriented x3, and in no apparent distress. Respiratory:  Respiratory effort is easy and symmetric bilaterally. Rate is normal at rest and on room air. Vascular:  Pedal Pulses is palpable and audible signal noted with doppler. Capillary refill is <5 sec to digits bilateral.  Extremities negative for pitting edema. Neurological:  Gross and Light touch intact. Protective sensation intact    Dermatological:  Wound description noted in wound assessment. Medial ankle The medial wound is improved with healthy granular base and decreased depth as well. No purulenced noted. Edema warmth and redness is improved. Latera wound tunnels approximately 0.7 centimeters. No draiange noted from this area. Psychiatric:  Judgement and insight intact. Short and long term memory intact. No evidence of depression, anxiety, or agitation. Patient is calm, cooperative, and communicative. Appropriate interactions and affect. Assessment:      Problem List Items Addressed This Visit     Acute hematogenous osteomyelitis ([address] (Chronic)           Procedure Note  Indications:  Based on my examination of this patient's wound(s)/ulcer(s) today, debridement is not required to promote healing and evaluate the wound base.   Wound 01/10/20 Ankle Right;Lateral #1 (Active)   Wound Image   1/24/2020  8:50 AM   Wound Non-Healing Surgical 1/24/2020  8:50 AM   Wound Cleansed Rinsed/Irrigated with saline 1/24/2020  8:50 AM   Wound Length (cm) 0.6 cm 1/24/2020  9:18 AM   Wound Width (cm) 0.4 cm 1/24/2020  9:18 AM   Wound Depth (cm) 1.4 cm 1/24/2020  9:18 AM   Wound Surface Area (cm^2) 0.24 cm^2 1/24/2020  9:18 AM   Change in Wound Size % (l*w) 70.37 1/24/2020  9:18 AM   Wound Volume (cm^3) 0.34 cm^3 1/24/2020  9:18 AM   Wound Healing % 72 1/24/2020  9:18 AM   Post-Procedure Length (cm) 0.9 cm 1/10/2020 11:40 AM   Post-Procedure Width (cm) 0.9 cm 1/10/2020 11:40 AM   Post-Procedure Depth (cm) 2 cm 1/10/2020 11:40 AM   Post-Procedure Surface Area (cm^2) 0.81 cm^2 1/10/2020 11:40 AM   Post-Procedure Volume (cm^3) 1.62 cm^3 1/10/2020 11:40 AM   Drainage Amount Large 1/24/2020  8:50 AM   Drainage Description Serosanguinous; Tan 1/24/2020  8:50 AM   Odor None 1/24/2020  8:50 AM   Margins Defined edges 1/24/2020  8:50 AM   Maria Esther-wound Assessment Dry; Intact 1/24/2020  8:50 AM   Non-staged Wound Description Full thickness 1/24/2020  8:50 AM   [place]%Wound Bed 50 1/17/2020  9:22 AM   Red%Wound Bed 98 1/24/2020  8:50 AM   Yellow%Wound Bed 2 1/24/2020  8:50 AM   Number of days: 13       Wound 01/10/20 Ankle Right;Medial #2 (Active)   Wound Image   1/24/2020  8:50 AM   Wound Other 1/24/2020  8:50 AM   Wound Cleansed Rinsed/Irrigated with saline 1/24/2020  8:50 AM   Wound Length (cm) 1.4 cm 1/24/2020  8:50 AM   Wound Width (cm) 0.8 cm 1/24/2020  8:50 AM   Wound Depth (cm) 0.7 cm 1/24/2020  8:50 AM   Wound Surface Area (cm^2) 1.12 cm^2 1/24/2020  8:50 AM   Change in Wound Size % (l*w) 68.89 1/24/2020  8:50 AM   Wound Volume (cm^3) 0.78 cm^3 1/24/2020  8:50 AM   Wound Healing % -117 1/24/2020  8:50 AM   Undermining Starts ___ O'Clock 3:00 1/17/2020  9:22 AM   Undermining Ends___ O'Clock 9:00 1/17/2020  9:22 AM   Undermining Maxium Distance (cm) .6 1/17/2020  9:22 AM   Drainage

## 2020-01-24 NOTE — FLOWSHEET NOTE
Patient to the floor ambulatory for his 1st iv antibiotic. Vital signs taken. Denies any discomfort. Call light within reach.

## 2020-01-25 ENCOUNTER — HOSPITAL ENCOUNTER (OUTPATIENT)
Dept: INFUSION THERAPY | Age: 50
Setting detail: INFUSION SERIES
Discharge: HOME | End: 2020-01-25
Payer: COMMERCIAL

## 2020-01-25 VITALS
DIASTOLIC BLOOD PRESSURE: 80 MMHG | OXYGEN SATURATION: 100 % | SYSTOLIC BLOOD PRESSURE: 135 MMHG | HEART RATE: 64 BPM | RESPIRATION RATE: 16 BRPM | TEMPERATURE: 97.5 F

## 2020-01-25 LAB
ANION GAP SERPL CALCULATED.3IONS-SCNC: 16 MEQ/L (ref 9–15)
BUN BLDV-MCNC: 15 MG/DL (ref 6–20)
C-REACTIVE PROTEIN, HIGH SENSITIVITY: 18.2 MG/L (ref 0–5)
CALCIUM SERPL-MCNC: 9.1 MG/DL (ref 8.5–9.9)
CHLORIDE BLD-SCNC: 102 MEQ/L (ref 95–107)
CO2: 22 MEQ/L (ref 20–31)
CREAT SERPL-MCNC: 1.02 MG/DL (ref 0.7–1.2)
GFR AFRICAN AMERICAN: >60
GFR NON-AFRICAN AMERICAN: >60
GLUCOSE BLD-MCNC: 100 MG/DL (ref 70–99)
HCT VFR BLD CALC: 37.8 % (ref 42–52)
HEMOGLOBIN: 12.4 G/DL (ref 14–18)
MCH RBC QN AUTO: 27.3 PG (ref 27–31.3)
MCHC RBC AUTO-ENTMCNC: 32.9 % (ref 33–37)
MCV RBC AUTO: 82.9 FL (ref 80–100)
PDW BLD-RTO: 16.8 % (ref 11.5–14.5)
PLATELET # BLD: 235 K/UL (ref 130–400)
POTASSIUM SERPL-SCNC: 4.3 MEQ/L (ref 3.4–4.9)
RBC # BLD: 4.56 M/UL (ref 4.7–6.1)
SODIUM BLD-SCNC: 140 MEQ/L (ref 135–144)
WBC # BLD: 3.5 K/UL (ref 4.8–10.8)

## 2020-01-25 PROCEDURE — 96365 THER/PROPH/DIAG IV INF INIT: CPT

## 2020-01-25 PROCEDURE — 85027 COMPLETE CBC AUTOMATED: CPT

## 2020-01-25 PROCEDURE — 6360000002 HC RX W HCPCS: Performed by: INTERNAL MEDICINE

## 2020-01-25 PROCEDURE — 80048 BASIC METABOLIC PNL TOTAL CA: CPT

## 2020-01-25 PROCEDURE — 2580000003 HC RX 258: Performed by: INTERNAL MEDICINE

## 2020-01-25 PROCEDURE — 36592 COLLECT BLOOD FROM PICC: CPT

## 2020-01-25 PROCEDURE — 86141 C-REACTIVE PROTEIN HS: CPT

## 2020-01-25 RX ADMIN — SODIUM CHLORIDE 1000 MG: 9 INJECTION, SOLUTION INTRAVENOUS at 10:57

## 2020-01-26 ENCOUNTER — HOSPITAL ENCOUNTER (OUTPATIENT)
Dept: INFUSION THERAPY | Age: 50
Setting detail: INFUSION SERIES
Discharge: HOME | End: 2020-01-26
Payer: COMMERCIAL

## 2020-01-26 VITALS
HEART RATE: 68 BPM | OXYGEN SATURATION: 95 % | RESPIRATION RATE: 18 BRPM | DIASTOLIC BLOOD PRESSURE: 88 MMHG | SYSTOLIC BLOOD PRESSURE: 135 MMHG | TEMPERATURE: 97.4 F

## 2020-01-26 PROCEDURE — 96365 THER/PROPH/DIAG IV INF INIT: CPT

## 2020-01-26 PROCEDURE — 6360000002 HC RX W HCPCS: Performed by: INTERNAL MEDICINE

## 2020-01-26 PROCEDURE — 2580000003 HC RX 258: Performed by: INTERNAL MEDICINE

## 2020-01-26 RX ADMIN — SODIUM CHLORIDE 1000 MG: 9 INJECTION, SOLUTION INTRAVENOUS at 10:47

## 2020-01-26 ASSESSMENT — PAIN SCALES - GENERAL: PAINLEVEL_OUTOF10: 0

## 2020-01-27 ENCOUNTER — HOSPITAL ENCOUNTER (OUTPATIENT)
Dept: INFUSION THERAPY | Age: 50
Setting detail: INFUSION SERIES
Discharge: HOME | End: 2020-01-27
Payer: COMMERCIAL

## 2020-01-27 VITALS
OXYGEN SATURATION: 98 % | TEMPERATURE: 98.4 F | HEART RATE: 80 BPM | DIASTOLIC BLOOD PRESSURE: 75 MMHG | SYSTOLIC BLOOD PRESSURE: 127 MMHG | RESPIRATION RATE: 20 BRPM

## 2020-01-27 PROCEDURE — 96365 THER/PROPH/DIAG IV INF INIT: CPT

## 2020-01-27 PROCEDURE — 6360000002 HC RX W HCPCS: Performed by: INTERNAL MEDICINE

## 2020-01-27 PROCEDURE — 2580000003 HC RX 258: Performed by: INTERNAL MEDICINE

## 2020-01-27 RX ADMIN — SODIUM CHLORIDE 1000 MG: 9 INJECTION, SOLUTION INTRAVENOUS at 09:27

## 2020-01-27 ASSESSMENT — PAIN SCALES - GENERAL: PAINLEVEL_OUTOF10: 0

## 2020-01-28 ENCOUNTER — HOSPITAL ENCOUNTER (OUTPATIENT)
Dept: INFUSION THERAPY | Age: 50
Setting detail: INFUSION SERIES
Discharge: HOME | End: 2020-01-28
Payer: COMMERCIAL

## 2020-01-28 VITALS
DIASTOLIC BLOOD PRESSURE: 71 MMHG | HEART RATE: 73 BPM | RESPIRATION RATE: 18 BRPM | TEMPERATURE: 98.7 F | SYSTOLIC BLOOD PRESSURE: 115 MMHG | OXYGEN SATURATION: 99 %

## 2020-01-28 PROCEDURE — 96365 THER/PROPH/DIAG IV INF INIT: CPT

## 2020-01-28 PROCEDURE — 6360000002 HC RX W HCPCS: Performed by: INTERNAL MEDICINE

## 2020-01-28 PROCEDURE — 2580000003 HC RX 258: Performed by: INTERNAL MEDICINE

## 2020-01-28 RX ADMIN — SODIUM CHLORIDE 1000 MG: 9 INJECTION, SOLUTION INTRAVENOUS at 11:45

## 2020-01-28 ASSESSMENT — PAIN SCALES - GENERAL: PAINLEVEL_OUTOF10: 0

## 2020-01-29 ENCOUNTER — HOSPITAL ENCOUNTER (OUTPATIENT)
Dept: INFUSION THERAPY | Age: 50
Setting detail: INFUSION SERIES
Discharge: HOME | End: 2020-01-29
Payer: COMMERCIAL

## 2020-01-29 ENCOUNTER — OFFICE VISIT (OUTPATIENT)
Dept: FAMILY MEDICINE CLINIC | Age: 50
End: 2020-01-29
Payer: COMMERCIAL

## 2020-01-29 VITALS
RESPIRATION RATE: 20 BRPM | SYSTOLIC BLOOD PRESSURE: 114 MMHG | HEART RATE: 72 BPM | TEMPERATURE: 98.1 F | OXYGEN SATURATION: 98 % | DIASTOLIC BLOOD PRESSURE: 74 MMHG

## 2020-01-29 VITALS
WEIGHT: 213 LBS | OXYGEN SATURATION: 95 % | SYSTOLIC BLOOD PRESSURE: 121 MMHG | DIASTOLIC BLOOD PRESSURE: 90 MMHG | TEMPERATURE: 98 F | HEART RATE: 70 BPM | HEIGHT: 74 IN | RESPIRATION RATE: 14 BRPM | BODY MASS INDEX: 27.34 KG/M2

## 2020-01-29 PROCEDURE — 6360000002 HC RX W HCPCS: Performed by: INTERNAL MEDICINE

## 2020-01-29 PROCEDURE — 99204 OFFICE O/P NEW MOD 45 MIN: CPT | Performed by: INTERNAL MEDICINE

## 2020-01-29 PROCEDURE — 96365 THER/PROPH/DIAG IV INF INIT: CPT

## 2020-01-29 PROCEDURE — 3017F COLORECTAL CA SCREEN DOC REV: CPT | Performed by: INTERNAL MEDICINE

## 2020-01-29 PROCEDURE — G8419 CALC BMI OUT NRM PARAM NOF/U: HCPCS | Performed by: INTERNAL MEDICINE

## 2020-01-29 PROCEDURE — G8484 FLU IMMUNIZE NO ADMIN: HCPCS | Performed by: INTERNAL MEDICINE

## 2020-01-29 PROCEDURE — 1036F TOBACCO NON-USER: CPT | Performed by: INTERNAL MEDICINE

## 2020-01-29 PROCEDURE — G8427 DOCREV CUR MEDS BY ELIG CLIN: HCPCS | Performed by: INTERNAL MEDICINE

## 2020-01-29 PROCEDURE — 2580000003 HC RX 258: Performed by: INTERNAL MEDICINE

## 2020-01-29 RX ORDER — ALENDRONATE SODIUM 70 MG/1
70 TABLET ORAL
COMMUNITY
End: 2020-04-07

## 2020-01-29 RX ADMIN — SODIUM CHLORIDE 1000 MG: 9 INJECTION, SOLUTION INTRAVENOUS at 11:06

## 2020-01-29 ASSESSMENT — PATIENT HEALTH QUESTIONNAIRE - PHQ9
1. LITTLE INTEREST OR PLEASURE IN DOING THINGS: 0
SUM OF ALL RESPONSES TO PHQ9 QUESTIONS 1 & 2: 0
2. FEELING DOWN, DEPRESSED OR HOPELESS: 0
SUM OF ALL RESPONSES TO PHQ QUESTIONS 1-9: 0
SUM OF ALL RESPONSES TO PHQ QUESTIONS 1-9: 0

## 2020-01-29 ASSESSMENT — PAIN SCALES - GENERAL: PAINLEVEL_OUTOF10: 0

## 2020-01-29 ASSESSMENT — ENCOUNTER SYMPTOMS
BACK PAIN: 0
SHORTNESS OF BREATH: 0
ABDOMINAL PAIN: 0
EYE PAIN: 0

## 2020-01-29 NOTE — PROGRESS NOTES
hematuria. Musculoskeletal: Negative for back pain. Allergic/Immunologic: Negative for immunocompromised state. Neurological: Negative for headaches. Psychiatric/Behavioral: Negative for hallucinations. Objective:   BP (!) 121/90 (Site: Right Upper Arm, Position: Sitting, Cuff Size: Large Adult)   Pulse 70   Temp 98 °F (36.7 °C) (Tympanic)   Resp 14   Ht 6' 2\" (1.88 m)   Wt 213 lb (96.6 kg)   SpO2 95%   BMI 27.35 kg/m²     Physical Exam  Constitutional:       Appearance: He is well-developed. HENT:      Head: Normocephalic. Comments: No suprclavicular ln   Eyes:      Pupils: Pupils are equal, round, and reactive to light. Neck:      Trachea: No tracheal deviation. Cardiovascular:      Rate and Rhythm: Normal rate and regular rhythm. Heart sounds: Normal heart sounds. No murmur. No friction rub. No gallop. Pulmonary:      Effort: No respiratory distress. Abdominal:      General: Bowel sounds are normal. There is no distension. Palpations: Abdomen is soft. Tenderness: There is no rebound. Skin:     General: Skin is warm and dry. Comments: Healing wound over right ankle, no spreading erythema. Silver alginate present on medial aspect, left is covered with gauze. approx pea sized opening on both medial and lateral aspect (medial and lateral malleoli)    Neurological:      Mental Status: He is oriented to person, place, and time. Assessment:       Diagnosis Orders   1. Leukopenia, unspecified type  CBC Auto Differential    Comprehensive Metabolic Panel    Hemoglobin A1C    Lipid Panel    TSH with Reflex   2. Anemia, unspecified type  CBC Auto Differential    Comprehensive Metabolic Panel    Hemoglobin A1C    Lipid Panel    TSH with Reflex   3. Other fatigue  CBC Auto Differential    Comprehensive Metabolic Panel    Hemoglobin A1C    Lipid Panel    TSH with Reflex    Testosterone Free And Total Male   4.  Osteomyelitis of right tibia, unspecified type

## 2020-01-30 ENCOUNTER — HOSPITAL ENCOUNTER (OUTPATIENT)
Dept: INFUSION THERAPY | Age: 50
Setting detail: INFUSION SERIES
Discharge: HOME | End: 2020-01-30
Payer: COMMERCIAL

## 2020-01-30 ENCOUNTER — TELEPHONE (OUTPATIENT)
Dept: INFECTIOUS DISEASES | Age: 50
End: 2020-01-30

## 2020-01-30 VITALS
SYSTOLIC BLOOD PRESSURE: 137 MMHG | RESPIRATION RATE: 18 BRPM | HEART RATE: 58 BPM | DIASTOLIC BLOOD PRESSURE: 96 MMHG | TEMPERATURE: 97.2 F

## 2020-01-30 PROCEDURE — 2580000003 HC RX 258: Performed by: INTERNAL MEDICINE

## 2020-01-30 PROCEDURE — 6360000002 HC RX W HCPCS: Performed by: INTERNAL MEDICINE

## 2020-01-30 PROCEDURE — 96365 THER/PROPH/DIAG IV INF INIT: CPT

## 2020-01-30 RX ADMIN — SODIUM CHLORIDE 1000 MG: 9 INJECTION, SOLUTION INTRAVENOUS at 12:46

## 2020-01-30 ASSESSMENT — PAIN SCALES - GENERAL: PAINLEVEL_OUTOF10: 0

## 2020-01-30 NOTE — TELEPHONE ENCOUNTER
Nurse requesting if Stan Hernandez with colonoscopy procedure while on IV Abx. Nurse has a couple of questions, and will assist with an appt once ID clears patient. Will update Dr Vee Freeman.

## 2020-01-31 ENCOUNTER — TELEPHONE (OUTPATIENT)
Dept: INFECTIOUS DISEASES | Age: 50
End: 2020-01-31

## 2020-01-31 ENCOUNTER — HOSPITAL ENCOUNTER (OUTPATIENT)
Dept: INFUSION THERAPY | Age: 50
Setting detail: INFUSION SERIES
Discharge: HOME | End: 2020-01-31
Payer: COMMERCIAL

## 2020-01-31 VITALS
DIASTOLIC BLOOD PRESSURE: 93 MMHG | RESPIRATION RATE: 20 BRPM | TEMPERATURE: 97.2 F | HEART RATE: 72 BPM | OXYGEN SATURATION: 100 % | SYSTOLIC BLOOD PRESSURE: 134 MMHG

## 2020-01-31 PROCEDURE — 2580000003 HC RX 258: Performed by: INTERNAL MEDICINE

## 2020-01-31 PROCEDURE — 6360000002 HC RX W HCPCS: Performed by: INTERNAL MEDICINE

## 2020-01-31 PROCEDURE — 96365 THER/PROPH/DIAG IV INF INIT: CPT

## 2020-01-31 RX ADMIN — SODIUM CHLORIDE 1000 MG: 9 INJECTION, SOLUTION INTRAVENOUS at 14:01

## 2020-01-31 ASSESSMENT — PAIN SCALES - GENERAL: PAINLEVEL_OUTOF10: 0

## 2020-01-31 NOTE — TELEPHONE ENCOUNTER
Patient called to inquire about having IV Abx completed at home. Started with Peg Etienne as of Sat, 1/25/20. Picc placed and First dose was 1/24/20. Patient believes this will be more cost effective and more tolerable. Patient contacted [facility], and they will need a New Referral.   Will update Dr Anabell Delacruz.

## 2020-01-31 NOTE — TELEPHONE ENCOUNTER
Per consult with Dr Pranav Jon, this is fine. Orders Re-faxed to [facility]. Also, call to Intake Dept, and Krysten Ortez was not in, spoke with Zechariah Kelley, she states she will look out for this New Referral for [address]. Call to patient to advise this orders have been sent. He appreciated the call. Will continue with Shannon Gipson as scheduled until he is able to completed at home.

## 2020-01-31 NOTE — TELEPHONE ENCOUNTER
Consulted Dr Benja Rivero about the colonoscopy, she states that is fine, ok to use picc line if needed. Pt to F/u as scheduled with ID. Return call to ADRIA Martinez not available, left message with Evan Sullivan, to relay the update. Also, updated the patient, he verbalized understanding.

## 2020-02-01 ENCOUNTER — HOSPITAL ENCOUNTER (OUTPATIENT)
Dept: INFUSION THERAPY | Age: 50
Setting detail: INFUSION SERIES
Discharge: HOME | End: 2020-02-01
Payer: COMMERCIAL

## 2020-02-01 VITALS
OXYGEN SATURATION: 100 % | SYSTOLIC BLOOD PRESSURE: 119 MMHG | TEMPERATURE: 97.1 F | HEART RATE: 64 BPM | DIASTOLIC BLOOD PRESSURE: 73 MMHG

## 2020-02-01 PROCEDURE — 96368 THER/DIAG CONCURRENT INF: CPT

## 2020-02-01 PROCEDURE — 96365 THER/PROPH/DIAG IV INF INIT: CPT

## 2020-02-01 PROCEDURE — 2580000003 HC RX 258: Performed by: INTERNAL MEDICINE

## 2020-02-01 PROCEDURE — 6360000002 HC RX W HCPCS: Performed by: INTERNAL MEDICINE

## 2020-02-01 RX ADMIN — SODIUM CHLORIDE 1000 MG: 9 INJECTION, SOLUTION INTRAVENOUS at 11:23

## 2020-02-02 ENCOUNTER — HOSPITAL ENCOUNTER (OUTPATIENT)
Dept: INFUSION THERAPY | Age: 50
Setting detail: INFUSION SERIES
Discharge: HOME | End: 2020-02-02
Payer: COMMERCIAL

## 2020-02-02 VITALS
DIASTOLIC BLOOD PRESSURE: 80 MMHG | HEART RATE: 62 BPM | TEMPERATURE: 97.2 F | RESPIRATION RATE: 16 BRPM | OXYGEN SATURATION: 100 % | SYSTOLIC BLOOD PRESSURE: 127 MMHG

## 2020-02-02 PROCEDURE — 2580000003 HC RX 258: Performed by: INTERNAL MEDICINE

## 2020-02-02 PROCEDURE — 96365 THER/PROPH/DIAG IV INF INIT: CPT

## 2020-02-02 PROCEDURE — 6360000002 HC RX W HCPCS: Performed by: INTERNAL MEDICINE

## 2020-02-02 RX ADMIN — SODIUM CHLORIDE 1000 MG: 9 INJECTION, SOLUTION INTRAVENOUS at 11:48

## 2020-02-02 ASSESSMENT — PAIN SCALES - GENERAL: PAINLEVEL_OUTOF10: 0

## 2020-02-04 ENCOUNTER — TELEPHONE (OUTPATIENT)
Dept: INFECTIOUS DISEASES | Age: 50
End: 2020-02-04

## 2020-02-04 NOTE — TELEPHONE ENCOUNTER
Discussed case extensively with Dr Noelle Trevizo  From orthopedic standpoint. If any new concerns arise, patient will be contacting him. He will continue regular follow ups.      [address]

## 2020-02-05 ENCOUNTER — TELEPHONE (OUTPATIENT)
Dept: ENDOSCOPY | Age: 50
End: 2020-02-05

## 2020-02-07 ENCOUNTER — HOSPITAL ENCOUNTER (OUTPATIENT)
Dept: WOUND CARE | Age: 50
Discharge: HOME | End: 2020-02-07
Payer: COMMERCIAL

## 2020-02-07 ENCOUNTER — HOSPITAL ENCOUNTER (OUTPATIENT)
Dept: INFUSION THERAPY | Age: 50
Setting detail: INFUSION SERIES
Discharge: HOME | End: 2020-02-07
Payer: COMMERCIAL

## 2020-02-07 VITALS
TEMPERATURE: 97.2 F | HEART RATE: 82 BPM | RESPIRATION RATE: 16 BRPM | DIASTOLIC BLOOD PRESSURE: 81 MMHG | SYSTOLIC BLOOD PRESSURE: 121 MMHG

## 2020-02-07 PROCEDURE — 99212 OFFICE O/P EST SF 10 MIN: CPT

## 2020-02-07 RX ORDER — ERTAPENEM 1 G/1
1000 INJECTION, POWDER, LYOPHILIZED, FOR SOLUTION INTRAMUSCULAR; INTRAVENOUS EVERY 24 HOURS
COMMUNITY
End: 2020-04-07 | Stop reason: ALTCHOICE

## 2020-02-07 NOTE — CODE DOCUMENTATION
[address] [facility] Physician Billing Sheet. JOVANA Melendez  AGE: 44 y.o.    GENDER: male  : 1970  TODAY'S DATE:  2020    ICD-10 CODES  Active Hospital Problems    Diagnosis Date Noted    Acute hematogenous osteomyelitis ([facility] [address] [M86.00] 2020    Cutaneous abscess of extremity [L02.419] 01/10/2020    Dehiscence of internal surgical incision, initial encounter Amber Marsh 01/10/2020       PHYSICIAN PROCEDURES    CPT CODE  87277      Electronically signed by Christoph Freed DPM on 2020 at 9:40 AM

## 2020-02-07 NOTE — PLAN OF CARE
Problem: Pain:  Goal: Control of chronic pain  Description  Control of chronic pain  Outcome: Ongoing

## 2020-02-07 NOTE — PROGRESS NOTES
[facility]   Progress Note and Procedure Note      Tiera Rodriguez New  MEDICAL RECORD NUMBER:  [id number]  AGE: 44 y.o. GENDER: male  : 1970  EPISODE DATE:  2020    Subjective:     Chief Complaint   Patient presents with    Wound Check     right foot/ankle         HISTORY of PRESENT ILLNESS HPI     JOVANA Back is a 44 y.o. male who presents today for wound/ulcer evaluation. History of Wound Context: Non-healing surgical incision of the right medial and lateral ankle Refferred to us by Dr. Nickolas Churchill. Patient has had several surgeries off his rght leg with IM nailing and attempted ankle fusion which failed.  He saw ID who recommends trying IV abx before fixation removal  Wound/Ulcer Pain Timing/Severity: intermittent  Quality of pain: aching  Severity:  3  10   Modifying Factors: Pain worsens with walking  Associated Signs/Symptoms: edema, drainage and pain    Ulcer Identification:  Ulcer Type: non-healing surgical  Contributing Factors: edema    Wound: Nonhealing surgical due to infection        PAST MEDICAL HISTORY        Diagnosis Date    Allergic rhinitis     Cancer ([address]     prostate    Hyperlipidemia     borderline    PVC (premature ventricular contraction)     benign       PAST SURGICAL HISTORY    Past Surgical History:   Procedure Laterality Date    CYST REMOVAL      buttocks    FINGER SURGERY      left thumb, game keepers    KNEE SURGERY      left, meniscal x 2     PROSTATECTOMY      cancer    TONSILLECTOMY  age 8       FAMILY HISTORY    Family History   Problem Relation Age of Onset    No Known Problems Mother         no FDR with aneurysms or valvae problems     Cancer Father         prostate    Cancer Paternal Grandfather         prostate    No Known Problems Sister        SOCIAL HISTORY    Social History     Tobacco Use    Smoking status: Never Smoker    Smokeless tobacco: Former User   Substance Use Topics    Alcohol use: Yes     Comment: one daily    Drug use: No       ALLERGIES    Allergies   Allergen Reactions    Varenicline      Other reaction(s): Intolerance  PVCs, dysrrhythmia    Vicodin [Hydrocodone-Acetaminophen] Other (See Comments)     SHAKES       MEDICATIONS    Current Outpatient Medications on File Prior to Encounter   Medication Sig Dispense Refill    ertapenem (INVANZ) 1 GM injection Inject 1,000 mg into the muscle every 24 hours      alendronate (FOSAMAX) 70 MG tablet Take 70 mg by mouth every 7 days       Current Facility-Administered Medications on File Prior to Encounter   Medication Dose Route Frequency Provider Last Rate Last Dose    ertapenem (INVANZ) 1 g IVPB minibag  1 g Intravenous Once Kim P Yannick, DO        ertapenem (INVANZ) 1 g IVPB minibag  1 g Intravenous Once Kim P Yannick, DO        ertapenem (INVANZ) 1 g IVPB minibag  1 g Intravenous Once Kim P Yannick, DO           REVIEW OF SYSTEMS    Pertinent items are noted in HPI. Objective:      /81   Pulse 82   Temp 97.2 °F (36.2 °C) (Temporal)   Resp 16     Wt Readings from Last 3 Encounters:   01/29/20 213 lb (96.6 kg)   01/24/20 211 lb (95.7 kg)   01/22/20 211 lb (95.7 kg)       PHYSICAL EXAM    Constitutional:   Well nourished and well developed. Appears neat and clean. Patient is alert, oriented x3, and in no apparent distress. Respiratory:  Respiratory effort is easy and symmetric bilaterally. Rate is normal at rest and on room air. Vascular:  Pedal Pulses is palpable and audible signal noted with doppler. Capillary refill is <5 sec to digits bilateral.  Extremities negative for pitting edema. Neurological:  Gross and Light touch intact. Protective sensation intact    Dermatological:  Wound description noted in wound assessment. Both medial and lateral wounds are healed. Psychiatric:  Judgement and insight intact. Short and long term memory intact. No evidence of depression, anxiety, or agitation.   Patient is calm, cooperative, and communicative. Appropriate interactions and affect. Assessment:      Problem List Items Addressed This Visit     None           Procedure Note  Indications:  Based on my examination of this patient's wound(s)/ulcer(s) today, debridement is not required to promote healing and evaluate the wound base. Wound 01/10/20 Ankle Right;Lateral #1 (Active)   Wound Image   2/7/2020  8:54 AM   Wound Non-Healing Surgical 2/7/2020  8:54 AM   Wound Cleansed Rinsed/Irrigated with saline 2/7/2020  8:54 AM   Wound Length (cm) 0 cm 2/7/2020  8:54 AM   Wound Width (cm) 0 cm 2/7/2020  8:54 AM   Wound Depth (cm) 0 cm 2/7/2020  8:54 AM   Wound Surface Area (cm^2) 0 cm^2 2/7/2020  8:54 AM   Change in Wound Size % (l*w) 100 2/7/2020  8:54 AM   Wound Volume (cm^3) 0 cm^3 2/7/2020  8:54 AM   Wound Healing % 100 2/7/2020  8:54 AM   Post-Procedure Length (cm) 0.9 cm 1/10/2020 11:40 AM   Post-Procedure Width (cm) 0.9 cm 1/10/2020 11:40 AM   Post-Procedure Depth (cm) 2 cm 1/10/2020 11:40 AM   Post-Procedure Surface Area (cm^2) 0.81 cm^2 1/10/2020 11:40 AM   Post-Procedure Volume (cm^3) 1.62 cm^3 1/10/2020 11:40 AM   Drainage Amount Large 1/24/2020  8:50 AM   Drainage Description Serosanguinous; Tan 1/24/2020  8:50 AM   Odor None 1/24/2020  8:50 AM   Margins Defined edges 1/24/2020  8:50 AM   Maria Esther-wound Assessment Dry; Intact 1/24/2020  8:50 AM   Non-staged Wound Description Full thickness 1/24/2020  8:50 AM   [place]%Wound Bed 50 1/17/2020  9:22 AM   Red%Wound Bed 98 1/24/2020  8:50 AM   Yellow%Wound Bed 2 1/24/2020  8:50 AM   Number of days: 27       Wound 01/10/20 Ankle Right;Medial #2 (Active)   Wound Image   2/7/2020  8:54 AM   Wound Non-Healing Surgical 2/7/2020  8:54 AM   Wound Cleansed Rinsed/Irrigated with saline 2/7/2020  8:54 AM   Wound Length (cm) 0 cm 2/7/2020  8:54 AM   Wound Width (cm) 0 cm 2/7/2020  8:54 AM   Wound Depth (cm) 0 cm 2/7/2020  8:54 AM   Wound Surface Area (cm^2) 0 cm^2 2/7/2020  8:54 AM   Change in Wound Size % (l*w) 100 2/7/2020  8:54 AM   Wound Volume (cm^3) 0 cm^3 2/7/2020  8:54 AM   Wound Healing % 100 2/7/2020  8:54 AM   Undermining Starts ___ O'Clock 3:00 1/17/2020  9:22 AM   Undermining Ends___ O'Clock 9:00 1/17/2020  9:22 AM   Undermining Maxium Distance (cm) .6 1/17/2020  9:22 AM   Drainage Amount Large 1/24/2020  8:50 AM   Drainage Description Tan;Green;Serosanguinous 1/24/2020  8:50 AM   Odor None 1/24/2020  8:50 AM   Margins Defined edges 1/24/2020  8:50 AM   Maria Esther-wound Assessment Dry; Intact 1/24/2020  8:50 AM   Non-staged Wound Description Full thickness 1/24/2020  8:50 AM   [place]%Wound Bed 5 1/10/2020 11:13 AM   Red%Wound Bed 90 1/24/2020  8:50 AM   Yellow%Wound Bed 10 1/24/2020  8:50 AM   Purple%Wound Bed 70 1/10/2020 11:13 AM   Number of days: 28                Plan:   Patient examined  PICC line  per ID  Follow up in  4 weeks  Dr. Alana Olmstead was Notified. Treatment Note please see attached Discharge Instructions    Written patient dismissal instructions given to patient and signed by patient or POA. Discharge Instructions       [address] [place] [address] and [facility]   Physician Orders and Discharge Instructions  [address]  Telephone: [phone]      [phone]        NAME: Marlon Melendez  DATE OF BIRTH:  1970  MEDICAL RECORD NUMBER:  [id number]     [facility]ility[facility]     Wound Location:   RIGHT LATERAL ANKLE AND RIGHT MEDIAL ANKLE  Dressing orders:  APPLY BACTROBAN AND DRY DRESSING TO RIGHT LATERAL ANKLE TODAY IN WOUND CENTER. Compression:     Offloading Device:   PATIENT HAS AN OFFLOADING BOOT TO WEAR ON RIGHT LOWER LEG.      Other Instructions: KEEP APPOINTMENT WITH INFECTIOUS DISEASE, MAY SHOWER AFTER TWO DAYS, DO NOT SOAK RIGHT ANKLE, Keep all dressings clean, dry and intact.  Keep pressure off the wound(s) at all times.      Follow up visit     4 WEEKS  MARCH 6, 2020 AT      Please give 24 hour notice if unable to keep appointment. [phone]     If you experience any of the following, please call the Wound Care Service at  [phone] or go to the nearest emergency room.        *Increase in pain         *Temperature over 101           *Increase in drainage from your wound or a foul odor  *Uncontrolled swelling            *Need for compression bandage changes due to slippage, breakthrough drainage     PLEASE NOTE: IF YOU ARE UNABLE TO OBTAIN WOUND SUPPLIES, CONTINUE TO USE THE SUPPLIES YOU HAVE AVAILABLE UNTIL YOU ARE ABLE TO [address].  IT IS MOST IMPORTANT TO KEEP THE WOUND COVERED AT ALL TIMES  Electronically signed by Morgan Craig DPM on 2/7/2020 at 9:17 AM          Electronically signed by Morgan Craig DPM on 2/7/2020 at 9:18 AM

## 2020-02-10 ENCOUNTER — HOSPITAL ENCOUNTER (OUTPATIENT)
Age: 50
Setting detail: SPECIMEN
Discharge: HOME | End: 2020-02-10
Payer: COMMERCIAL

## 2020-02-10 LAB
ANION GAP SERPL CALCULATED.3IONS-SCNC: 18 MEQ/L (ref 9–15)
ANISOCYTOSIS: ABNORMAL
ATYPICAL LYMPHOCYTE RELATIVE PERCENT: 2 %
BANDED NEUTROPHILS RELATIVE PERCENT: 1 %
BASOPHILS ABSOLUTE: 0 K/UL (ref 0–0.2)
BASOPHILS RELATIVE PERCENT: 1 %
BUN BLDV-MCNC: 12 MG/DL (ref 6–20)
C-REACTIVE PROTEIN: 3.5 MG/L (ref 0–5)
CALCIUM SERPL-MCNC: 9.4 MG/DL (ref 8.5–9.9)
CHLORIDE BLD-SCNC: 99 MEQ/L (ref 95–107)
CO2: 21 MEQ/L (ref 20–31)
CREAT SERPL-MCNC: 0.84 MG/DL (ref 0.7–1.2)
EOSINOPHILS ABSOLUTE: 0.2 K/UL (ref 0–0.7)
EOSINOPHILS RELATIVE PERCENT: 5 %
GFR AFRICAN AMERICAN: >60
GFR NON-AFRICAN AMERICAN: >60
GLUCOSE BLD-MCNC: 76 MG/DL (ref 70–99)
HCT VFR BLD CALC: 40.4 % (ref 42–52)
HEMOGLOBIN: 13.5 G/DL (ref 14–18)
LYMPHOCYTES ABSOLUTE: 2 K/UL (ref 1–4.8)
LYMPHOCYTES RELATIVE PERCENT: 39 %
MCH RBC QN AUTO: 27.8 PG (ref 27–31.3)
MCHC RBC AUTO-ENTMCNC: 33.4 % (ref 33–37)
MCV RBC AUTO: 83.1 FL (ref 80–100)
MONOCYTES ABSOLUTE: 0.3 K/UL (ref 0.2–0.8)
MONOCYTES RELATIVE PERCENT: 6.6 %
MYELOCYTE PERCENT: 1 %
NEUTROPHILS ABSOLUTE: 2.3 K/UL (ref 1.4–6.5)
NEUTROPHILS RELATIVE PERCENT: 45 %
OVALOCYTES: ABNORMAL
PDW BLD-RTO: 16.7 % (ref 11.5–14.5)
PLATELET # BLD: 327 K/UL (ref 130–400)
PLATELET SLIDE REVIEW: NORMAL
POIKILOCYTES: ABNORMAL
POLYCHROMASIA: ABNORMAL
POTASSIUM SERPL-SCNC: 4.4 MEQ/L (ref 3.4–4.9)
RBC # BLD: 4.86 M/UL (ref 4.7–6.1)
SMUDGE CELLS: 8.5
SODIUM BLD-SCNC: 138 MEQ/L (ref 135–144)
VACUOLATED NEUTROPHILS: PRESENT
WBC # BLD: 4.8 K/UL (ref 4.8–10.8)

## 2020-02-10 PROCEDURE — 86140 C-REACTIVE PROTEIN: CPT

## 2020-02-10 PROCEDURE — 80048 BASIC METABOLIC PNL TOTAL CA: CPT

## 2020-02-10 PROCEDURE — 85025 COMPLETE CBC W/AUTO DIFF WBC: CPT

## 2020-02-12 ENCOUNTER — OFFICE VISIT (OUTPATIENT)
Dept: INFECTIOUS DISEASES | Age: 50
End: 2020-02-12
Payer: COMMERCIAL

## 2020-02-12 VITALS
WEIGHT: 212 LBS | DIASTOLIC BLOOD PRESSURE: 92 MMHG | HEART RATE: 65 BPM | SYSTOLIC BLOOD PRESSURE: 133 MMHG | RESPIRATION RATE: 16 BRPM | HEIGHT: 74 IN | TEMPERATURE: 97.6 F | BODY MASS INDEX: 27.21 KG/M2

## 2020-02-12 PROCEDURE — G8419 CALC BMI OUT NRM PARAM NOF/U: HCPCS | Performed by: INTERNAL MEDICINE

## 2020-02-12 PROCEDURE — 1036F TOBACCO NON-USER: CPT | Performed by: INTERNAL MEDICINE

## 2020-02-12 PROCEDURE — 99214 OFFICE O/P EST MOD 30 MIN: CPT | Performed by: INTERNAL MEDICINE

## 2020-02-12 PROCEDURE — G8484 FLU IMMUNIZE NO ADMIN: HCPCS | Performed by: INTERNAL MEDICINE

## 2020-02-12 PROCEDURE — 3017F COLORECTAL CA SCREEN DOC REV: CPT | Performed by: INTERNAL MEDICINE

## 2020-02-12 PROCEDURE — G8427 DOCREV CUR MEDS BY ELIG CLIN: HCPCS | Performed by: INTERNAL MEDICINE

## 2020-02-12 NOTE — PROGRESS NOTES
infectious disease perspective. WBC trends are being monitored. Lab Results   Component Value Date     02/10/2020    K 4.4 02/10/2020    CL 99 02/10/2020    CO2 21 02/10/2020    BUN 12 02/10/2020    CREATININE 0.84 02/10/2020    GLUCOSE 76 02/10/2020    CALCIUM 9.4 02/10/2020      Lab Results   Component Value Date    WBC 4.8 02/10/2020    HGB 13.5 (L) 02/10/2020    HCT 40.4 (L) 02/10/2020    MCV 83.1 02/10/2020     02/10/2020       Radiology:   I have reviewed imaging results per electronic record and most pertinent abnormalities are being addressed from an infectious disease standpoint. Assessment:  PJI, possible OM RLE  Retained orthopedic hardware  MSSA and Peptostreptococcus infection    Plan:  Discussed with patient's ortho and podiatry  Salvage treatment   Mckenna Tidwell for now. Plan on extended treatment for 8 weeks. Follow up in 4 weeks. Weekly CRP to monitor trend. No history of MRSA infections.    Possible chronic suppression at EOT      Kim Lanier D.O.

## 2020-02-17 LAB
ANION GAP SERPL CALCULATED.3IONS-SCNC: 15 MMOL/L (ref 10–20)
BASOPHILS # BLD: 0.03 X10E9/L (ref 0–0.1)
BASOPHILS RELATIVE PERCENT: 0.8 % (ref 0–2)
BICARBONATE: 27 MMOL/L (ref 21–32)
C-REACTIVE PROTEIN: 0.22 MG/DL
CALCIUM SERPL-MCNC: 9.7 MG/DL (ref 8.6–10.3)
CHLORIDE BLD-SCNC: 101 MMOL/L (ref 98–107)
CREAT SERPL-MCNC: 0.95 MG/DL (ref 0.5–1.3)
EOSINOPHIL # BLD: 0.11 X10E9/L (ref 0–0.7)
EOSINOPHILS RELATIVE PERCENT: 3 % (ref 0–6)
ERYTHROCYTE [DISTWIDTH] IN BLOOD BY AUTOMATED COUNT: 15.6 % (ref 11.5–14)
GFR AFRICAN AMERICAN: >60 ML/MIN/1.73M2
GFR NON-AFRICAN AMERICAN: >60 ML/MIN/1.73M2
GLUCOSE: 86 MG/DL (ref 74–99)
HCT VFR BLD CALC: 42.1 % (ref 41–52)
HEMOGLOBIN: 13.7 G/DL (ref 13.5–17)
IMMATURE GRANULOCYTES %: 0 % (ref 0–0.9)
LYMPHOCYTES # BLD: 52.6 % (ref 13–44)
LYMPHOCYTES RELATIVE PERCENT: 1.93 X10E9/L (ref 1.2–4.8)
MCHC RBC AUTO-ENTMCNC: 32.5 G/DL (ref 32–36)
MCV RBC AUTO: 84 FL (ref 80–100)
MONOCYTES # BLD: 0.3 X10E9/L (ref 0.1–1)
MONOCYTES RELATIVE PERCENT: 8.2 % (ref 2–10)
NEUTROPHILS RELATIVE PERCENT: 35.4 % (ref 40–80)
NEUTROPHILS: 1.3 X10E9/L (ref 1.2–7.7)
PLATELET # BLD: 291 X10E9/L (ref 150–450)
POTASSIUM SERPL-SCNC: 4.2 MMOL/L (ref 3.5–5.3)
RBC # BLD: 5.01 X10E12/L (ref 4.5–5.9)
SODIUM BLD-SCNC: 139 MMOL/L (ref 136–145)
UREA NITROGEN: 15 MG/DL (ref 6–23)
WBC: 3.7 X10E9/L (ref 4.4–11.3)

## 2020-02-19 ENCOUNTER — ANESTHESIA (OUTPATIENT)
Dept: OPERATING ROOM | Age: 50
End: 2020-02-19
Payer: COMMERCIAL

## 2020-02-19 ENCOUNTER — HOSPITAL ENCOUNTER (OUTPATIENT)
Age: 50
Setting detail: OUTPATIENT SURGERY
Discharge: HOME | End: 2020-02-19
Attending: SPECIALIST | Admitting: SPECIALIST
Payer: COMMERCIAL

## 2020-02-19 ENCOUNTER — ANESTHESIA EVENT (OUTPATIENT)
Dept: OPERATING ROOM | Age: 50
End: 2020-02-19
Payer: COMMERCIAL

## 2020-02-19 VITALS
RESPIRATION RATE: 16 BRPM | SYSTOLIC BLOOD PRESSURE: 100 MMHG | HEART RATE: 55 BPM | WEIGHT: 210 LBS | TEMPERATURE: 97.5 F | OXYGEN SATURATION: 97 % | HEIGHT: 74 IN | DIASTOLIC BLOOD PRESSURE: 60 MMHG | BODY MASS INDEX: 26.95 KG/M2

## 2020-02-19 VITALS
SYSTOLIC BLOOD PRESSURE: 94 MMHG | OXYGEN SATURATION: 100 % | RESPIRATION RATE: 15 BRPM | DIASTOLIC BLOOD PRESSURE: 63 MMHG

## 2020-02-19 PROCEDURE — 3700000001 HC ADD 15 MINUTES (ANESTHESIA): Performed by: SPECIALIST

## 2020-02-19 PROCEDURE — 6360000002 HC RX W HCPCS: Performed by: NURSE ANESTHETIST, CERTIFIED REGISTERED

## 2020-02-19 PROCEDURE — 2580000003 HC RX 258: Performed by: NURSE PRACTITIONER

## 2020-02-19 PROCEDURE — 88305 TISSUE EXAM BY PATHOLOGIST: CPT

## 2020-02-19 PROCEDURE — 3700000000 HC ANESTHESIA ATTENDED CARE: Performed by: SPECIALIST

## 2020-02-19 PROCEDURE — 7100000011 HC PHASE II RECOVERY - ADDTL 15 MIN: Performed by: SPECIALIST

## 2020-02-19 PROCEDURE — 3609027000 HC COLONOSCOPY: Performed by: SPECIALIST

## 2020-02-19 PROCEDURE — 2709999900 HC NON-CHARGEABLE SUPPLY: Performed by: SPECIALIST

## 2020-02-19 PROCEDURE — 2580000003 HC RX 258: Performed by: SPECIALIST

## 2020-02-19 PROCEDURE — 7100000010 HC PHASE II RECOVERY - FIRST 15 MIN: Performed by: SPECIALIST

## 2020-02-19 PROCEDURE — 45385 COLONOSCOPY W/LESION REMOVAL: CPT | Performed by: SPECIALIST

## 2020-02-19 RX ORDER — LIDOCAINE HYDROCHLORIDE 10 MG/ML
1 INJECTION, SOLUTION EPIDURAL; INFILTRATION; INTRACAUDAL; PERINEURAL
Status: DISCONTINUED | OUTPATIENT
Start: 2020-02-19 | End: 2020-02-19 | Stop reason: HOSPADM

## 2020-02-19 RX ORDER — SODIUM CHLORIDE 0.9 % (FLUSH) 0.9 %
10 SYRINGE (ML) INJECTION EVERY 12 HOURS SCHEDULED
Status: DISCONTINUED | OUTPATIENT
Start: 2020-02-19 | End: 2020-02-19 | Stop reason: HOSPADM

## 2020-02-19 RX ORDER — MAGNESIUM HYDROXIDE 1200 MG/15ML
LIQUID ORAL PRN
Status: DISCONTINUED | OUTPATIENT
Start: 2020-02-19 | End: 2020-02-19 | Stop reason: ALTCHOICE

## 2020-02-19 RX ORDER — PROPOFOL 10 MG/ML
INJECTION, EMULSION INTRAVENOUS PRN
Status: DISCONTINUED | OUTPATIENT
Start: 2020-02-19 | End: 2020-02-19 | Stop reason: SDUPTHER

## 2020-02-19 RX ORDER — SODIUM CHLORIDE 0.9 % (FLUSH) 0.9 %
10 SYRINGE (ML) INJECTION PRN
Status: DISCONTINUED | OUTPATIENT
Start: 2020-02-19 | End: 2020-02-19 | Stop reason: HOSPADM

## 2020-02-19 RX ORDER — SODIUM CHLORIDE, SODIUM LACTATE, POTASSIUM CHLORIDE, CALCIUM CHLORIDE 600; 310; 30; 20 MG/100ML; MG/100ML; MG/100ML; MG/100ML
INJECTION, SOLUTION INTRAVENOUS CONTINUOUS
Status: DISCONTINUED | OUTPATIENT
Start: 2020-02-19 | End: 2020-02-19 | Stop reason: HOSPADM

## 2020-02-19 RX ORDER — ONDANSETRON 2 MG/ML
4 INJECTION INTRAMUSCULAR; INTRAVENOUS
Status: DISCONTINUED | OUTPATIENT
Start: 2020-02-19 | End: 2020-02-19 | Stop reason: HOSPADM

## 2020-02-19 RX ADMIN — PROPOFOL 50 MG: 10 INJECTION, EMULSION INTRAVENOUS at 11:57

## 2020-02-19 RX ADMIN — PROPOFOL 50 MG: 10 INJECTION, EMULSION INTRAVENOUS at 12:01

## 2020-02-19 RX ADMIN — PROPOFOL 50 MG: 10 INJECTION, EMULSION INTRAVENOUS at 12:05

## 2020-02-19 RX ADMIN — SODIUM CHLORIDE, POTASSIUM CHLORIDE, SODIUM LACTATE AND CALCIUM CHLORIDE: 600; 310; 30; 20 INJECTION, SOLUTION INTRAVENOUS at 11:44

## 2020-02-19 RX ADMIN — PROPOFOL 100 MG: 10 INJECTION, EMULSION INTRAVENOUS at 11:52

## 2020-02-19 RX ADMIN — PROPOFOL 50 MG: 10 INJECTION, EMULSION INTRAVENOUS at 12:09

## 2020-02-19 ASSESSMENT — PULMONARY FUNCTION TESTS
PIF_VALUE: 0
PIF_VALUE: 1
PIF_VALUE: 0
PIF_VALUE: 0
PIF_VALUE: 1
PIF_VALUE: 0
PIF_VALUE: 1

## 2020-02-19 ASSESSMENT — PAIN SCALES - GENERAL
PAINLEVEL_OUTOF10: 0
PAINLEVEL_OUTOF10: 0

## 2020-02-19 ASSESSMENT — PAIN - FUNCTIONAL ASSESSMENT: PAIN_FUNCTIONAL_ASSESSMENT: 0-10

## 2020-02-19 NOTE — ANESTHESIA PRE PROCEDURE
Department of Anesthesiology  Preprocedure Note       Name:  Erica Jaimes   Age:  44 y.o.  :  1970                                          MRN:  [id number]         Date:  2020      Surgeon: Lee Valdez):  Luther Monique MD    Procedure: COLORECTAL CANCER SCREENING, NOT HIGH RISK (N/A )    Medications prior to admission:   Prior to Admission medications    Medication Sig Start Date End Date Taking? Authorizing Provider   ertapenem (INVANZ) 1 GM injection Inject 1,000 mg into the muscle every 24 hours    Historical Provider, MD   alendronate (FOSAMAX) 70 MG tablet Take 70 mg by mouth every 7 days    Historical Provider, MD       Current medications:    Current Facility-Administered Medications   Medication Dose Route Frequency Provider Last Rate Last Dose    lactated ringers infusion   Intravenous Continuous Ivana Pauly, APRN - CNP        sodium chloride flush 0.9 % injection 10 mL  10 mL Intravenous 2 times per day Ivana Pauly, APRN - CNP        sodium chloride flush 0.9 % injection 10 mL  10 mL Intravenous PRN Ivana Pauly, APRN - CNP        lidocaine PF 1 % injection 1 mL  1 mL Intradermal Once PRN Ivana Pauly, APRN - CNP           Allergies: Allergies   Allergen Reactions    Varenicline      Other reaction(s): Intolerance  PVCs, dysrrhythmia    Vicodin [Hydrocodone-Acetaminophen] Other (See Comments)     SHAKES       Problem List:    Patient Active Problem List   Diagnosis Code    Heart palpitations R00.2    Lightheadedness R42     Z78.9    Arm heaviness R29.898    Dehiscence of internal surgical incision, initial encounter T81. 31XA    Cutaneous abscess of extremity L02.419    Acute hematogenous osteomyelitis ([facility] [address] [facility] M86.00       Past Medical History:        Diagnosis Date    Allergic rhinitis     Cancer ([address]     prostate    Hyperlipidemia     borderline    PVC (premature ventricular contraction)     benign       Past Surgical History:

## 2020-02-19 NOTE — ANESTHESIA POSTPROCEDURE EVALUATION
Department of Anesthesiology  Postprocedure Note    Patient: Amadou Salas  MRN: [id number]  [place]: 1970  Date of evaluation: 2/19/2020  Time:  12:18 PM     Procedure Summary     Date:  02/19/20 Room / Location:  [address] [place] [address]    Anesthesia Start:  1150 Anesthesia Stop:  1217    Procedure:  COLORECTAL CANCER SCREENING, NOT HIGH RISK (N/A ) Diagnosis:  (Colon cancer screening Z12.11 (CPT ))    Surgeon:  David Baig MD Responsible Provider:  MANDA Worley CRNA    Anesthesia Type:  MAC ASA Status:  2          Anesthesia Type: MAC    Abundio Phase I:      Abundio Phase II:      Last vitals: Reviewed and per EMR flowsheets.        Anesthesia Post Evaluation    Patient location during evaluation: PACU  Patient participation: complete - patient participated  Level of consciousness: awake  Airway patency: patent  Nausea & Vomiting: no nausea and no vomiting  Complications: no  Cardiovascular status: blood pressure returned to baseline  Respiratory status: acceptable  Hydration status: euvolemic

## 2020-02-25 LAB
ANION GAP SERPL CALCULATED.3IONS-SCNC: 11 MMOL/L (ref 10–20)
BASOPHILS # BLD: 0.02 X10E9/L (ref 0–0.1)
BASOPHILS RELATIVE PERCENT: 0.5 % (ref 0–2)
BICARBONATE: 27 MMOL/L (ref 21–32)
C-REACTIVE PROTEIN: 0.28 MG/DL
CALCIUM SERPL-MCNC: 9.4 MG/DL (ref 8.6–10.3)
CHLORIDE BLD-SCNC: 102 MMOL/L (ref 98–107)
CREAT SERPL-MCNC: 0.99 MG/DL (ref 0.5–1.3)
EOSINOPHIL # BLD: 0.16 X10E9/L (ref 0–0.7)
EOSINOPHILS RELATIVE PERCENT: 4.3 % (ref 0–6)
ERYTHROCYTE [DISTWIDTH] IN BLOOD BY AUTOMATED COUNT: 15.7 % (ref 11.5–14)
GFR AFRICAN AMERICAN: >60 ML/MIN/1.73M2
GFR NON-AFRICAN AMERICAN: >60 ML/MIN/1.73M2
GLUCOSE: 75 MG/DL (ref 74–99)
HCT VFR BLD CALC: 41.2 % (ref 41–52)
HEMOGLOBIN: 13.6 G/DL (ref 13.5–17)
IMMATURE GRANULOCYTES %: 0 % (ref 0–0.9)
LYMPHOCYTES # BLD: 52.3 % (ref 13–44)
LYMPHOCYTES RELATIVE PERCENT: 1.93 X10E9/L (ref 1.2–4.8)
MCHC RBC AUTO-ENTMCNC: 33 G/DL (ref 32–36)
MCV RBC AUTO: 84 FL (ref 80–100)
MONOCYTES # BLD: 0.36 X10E9/L (ref 0.1–1)
MONOCYTES RELATIVE PERCENT: 9.8 % (ref 2–10)
NEUTROPHILS RELATIVE PERCENT: 33.1 % (ref 40–80)
NEUTROPHILS: 1.22 X10E9/L (ref 1.2–7.7)
PLATELET # BLD: 217 X10E9/L (ref 150–450)
POTASSIUM SERPL-SCNC: 4.1 MMOL/L (ref 3.5–5.3)
RBC # BLD: 4.92 X10E12/L (ref 4.5–5.9)
SODIUM BLD-SCNC: 136 MMOL/L (ref 136–145)
UREA NITROGEN: 17 MG/DL (ref 6–23)
WBC: 3.7 X10E9/L (ref 4.4–11.3)

## 2020-03-02 LAB
ANION GAP SERPL CALCULATED.3IONS-SCNC: 14 MMOL/L (ref 10–20)
BASOPHILS # BLD: 0.04 X10E9/L (ref 0–0.1)
BASOPHILS RELATIVE PERCENT: 0.8 % (ref 0–2)
BICARBONATE: 25 MMOL/L (ref 21–32)
C-REACTIVE PROTEIN: 0.24 MG/DL
CALCIUM SERPL-MCNC: 9.2 MG/DL (ref 8.6–10.3)
CHLORIDE BLD-SCNC: 102 MMOL/L (ref 98–107)
CREAT SERPL-MCNC: 1.06 MG/DL (ref 0.5–1.3)
EOSINOPHIL # BLD: 0.14 X10E9/L (ref 0–0.7)
EOSINOPHILS RELATIVE PERCENT: 2.9 % (ref 0–6)
ERYTHROCYTE [DISTWIDTH] IN BLOOD BY AUTOMATED COUNT: 15.9 % (ref 11.5–14)
GFR AFRICAN AMERICAN: >60 ML/MIN/1.73M2
GFR NON-AFRICAN AMERICAN: >60 ML/MIN/1.73M2
GLUCOSE: 78 MG/DL (ref 74–99)
HCT VFR BLD CALC: 40.4 % (ref 41–52)
HEMOGLOBIN: 13.1 G/DL (ref 13.5–17)
IMMATURE GRANULOCYTES %: 0.4 % (ref 0–0.9)
LYMPHOCYTES # BLD: 28.2 % (ref 13–44)
LYMPHOCYTES RELATIVE PERCENT: 1.35 X10E9/L (ref 1.2–4.8)
MCHC RBC AUTO-ENTMCNC: 32.4 G/DL (ref 32–36)
MCV RBC AUTO: 85 FL (ref 80–100)
MONOCYTES # BLD: 0.43 X10E9/L (ref 0.1–1)
MONOCYTES RELATIVE PERCENT: 9 % (ref 2–10)
NEUTROPHILS RELATIVE PERCENT: 58.7 % (ref 40–80)
NEUTROPHILS: 2.8 X10E9/L (ref 1.2–7.7)
PLATELET # BLD: 223 X10E9/L (ref 150–450)
POTASSIUM SERPL-SCNC: 4.5 MMOL/L (ref 3.5–5.3)
RBC # BLD: 4.75 X10E12/L (ref 4.5–5.9)
SODIUM BLD-SCNC: 136 MMOL/L (ref 136–145)
UREA NITROGEN: 19 MG/DL (ref 6–23)
WBC: 4.8 X10E9/L (ref 4.4–11.3)

## 2020-03-06 ENCOUNTER — HOSPITAL ENCOUNTER (OUTPATIENT)
Dept: LAB | Age: 50
Discharge: HOME | End: 2020-03-06
Payer: COMMERCIAL

## 2020-03-06 LAB
CHOLESTEROL, TOTAL: 210 MG/DL (ref 0–199)
HDLC SERPL-MCNC: 52 MG/DL (ref 40–59)
LDL CHOLESTEROL CALCULATED: 132 MG/DL (ref 0–129)
TRIGL SERPL-MCNC: 132 MG/DL (ref 0–150)
TSH REFLEX: 2.27 UIU/ML (ref 0.44–3.86)

## 2020-03-06 PROCEDURE — 36415 COLL VENOUS BLD VENIPUNCTURE: CPT

## 2020-03-06 PROCEDURE — 84270 ASSAY OF SEX HORMONE GLOBUL: CPT

## 2020-03-06 PROCEDURE — 84403 ASSAY OF TOTAL TESTOSTERONE: CPT

## 2020-03-06 PROCEDURE — 84443 ASSAY THYROID STIM HORMONE: CPT

## 2020-03-06 PROCEDURE — 80061 LIPID PANEL: CPT

## 2020-03-08 LAB
SEX HORMONE BINDING GLOBULIN: 42 NMOL/L (ref 11–80)
TESTOSTERONE FREE PERCENT: 1.6 % (ref 1.6–2.9)
TESTOSTERONE FREE, CALC: 62 PG/ML (ref 47–244)
TESTOSTERONE TOTAL-MALE: 386 NG/DL (ref 300–890)

## 2020-03-09 LAB
ANION GAP SERPL CALCULATED.3IONS-SCNC: 12 MMOL/L (ref 10–20)
BASOPHILS # BLD: 0.05 X10E9/L (ref 0–0.1)
BASOPHILS RELATIVE PERCENT: 1.1 % (ref 0–2)
BICARBONATE: 26 MMOL/L (ref 21–32)
C-REACTIVE PROTEIN: 0.21 MG/DL
CALCIUM SERPL-MCNC: 9.4 MG/DL (ref 8.6–10.3)
CHLORIDE BLD-SCNC: 102 MMOL/L (ref 98–107)
CREAT SERPL-MCNC: 0.97 MG/DL (ref 0.5–1.3)
EOSINOPHIL # BLD: 0.22 X10E9/L (ref 0–0.7)
EOSINOPHILS RELATIVE PERCENT: 5 % (ref 0–6)
ERYTHROCYTE [DISTWIDTH] IN BLOOD BY AUTOMATED COUNT: 16.2 % (ref 11.5–14)
GFR AFRICAN AMERICAN: >60 ML/MIN/1.73M2
GFR NON-AFRICAN AMERICAN: >60 ML/MIN/1.73M2
GLUCOSE: 83 MG/DL (ref 74–99)
HCT VFR BLD CALC: 42.9 % (ref 41–52)
HEMOGLOBIN: 14 G/DL (ref 13.5–17)
IMMATURE GRANULOCYTES %: 0.2 % (ref 0–0.9)
LYMPHOCYTES # BLD: 44.5 % (ref 13–44)
LYMPHOCYTES RELATIVE PERCENT: 1.94 X10E9/L (ref 1.2–4.8)
MCHC RBC AUTO-ENTMCNC: 32.6 G/DL (ref 32–36)
MCV RBC AUTO: 86 FL (ref 80–100)
MONOCYTES # BLD: 0.4 X10E9/L (ref 0.1–1)
MONOCYTES RELATIVE PERCENT: 9.2 % (ref 2–10)
NEUTROPHILS RELATIVE PERCENT: 40 % (ref 40–80)
NEUTROPHILS: 1.74 X10E9/L (ref 1.2–7.7)
PLATELET # BLD: 276 X10E9/L (ref 150–450)
POTASSIUM SERPL-SCNC: 4.7 MMOL/L (ref 3.5–5.3)
RBC # BLD: 5.01 X10E12/L (ref 4.5–5.9)
SODIUM BLD-SCNC: 135 MMOL/L (ref 136–145)
UREA NITROGEN: 18 MG/DL (ref 6–23)
WBC: 4.4 X10E9/L (ref 4.4–11.3)

## 2020-03-10 ENCOUNTER — TELEPHONE (OUTPATIENT)
Dept: FAMILY MEDICINE CLINIC | Age: 50
End: 2020-03-10

## 2020-03-10 ENCOUNTER — OFFICE VISIT (OUTPATIENT)
Dept: FAMILY MEDICINE CLINIC | Age: 50
End: 2020-03-10
Payer: COMMERCIAL

## 2020-03-10 VITALS
SYSTOLIC BLOOD PRESSURE: 134 MMHG | OXYGEN SATURATION: 99 % | DIASTOLIC BLOOD PRESSURE: 94 MMHG | HEART RATE: 67 BPM | TEMPERATURE: 97.9 F | WEIGHT: 210 LBS | RESPIRATION RATE: 16 BRPM | HEIGHT: 74 IN | BODY MASS INDEX: 26.95 KG/M2

## 2020-03-10 PROCEDURE — G8419 CALC BMI OUT NRM PARAM NOF/U: HCPCS | Performed by: INTERNAL MEDICINE

## 2020-03-10 PROCEDURE — G8427 DOCREV CUR MEDS BY ELIG CLIN: HCPCS | Performed by: INTERNAL MEDICINE

## 2020-03-10 PROCEDURE — 3017F COLORECTAL CA SCREEN DOC REV: CPT | Performed by: INTERNAL MEDICINE

## 2020-03-10 PROCEDURE — G8484 FLU IMMUNIZE NO ADMIN: HCPCS | Performed by: INTERNAL MEDICINE

## 2020-03-10 PROCEDURE — 99213 OFFICE O/P EST LOW 20 MIN: CPT | Performed by: INTERNAL MEDICINE

## 2020-03-10 PROCEDURE — 1036F TOBACCO NON-USER: CPT | Performed by: INTERNAL MEDICINE

## 2020-03-10 ASSESSMENT — ENCOUNTER SYMPTOMS
BACK PAIN: 0
EYE PAIN: 0
ABDOMINAL PAIN: 0
SHORTNESS OF BREATH: 0

## 2020-03-10 NOTE — PATIENT INSTRUCTIONS
with your doctor or counselor. · Get some physical activity every day, but do not get too tired. Keep doing the hobbies you enjoy as your energy allows. · If you are vomiting or have diarrhea:  ? Drink plenty of fluids (enough so that your urine is light yellow or clear like water) to prevent dehydration. Choose water and other caffeine-free clear liquids. If you have kidney, heart, or liver disease and have to limit fluids, talk with your doctor before you increase the amount of fluids you drink. ? When you are able to eat, try clear soups, mild foods, and liquids until all symptoms are gone for 12 to 48 hours. Jell-O, dry toast, crackers, and cooked cereal are also good choices. · If you have not already done so, prepare a list of advance directives. Advance directives are instructions to your doctor and family members about what kind of care you want if you become unable to speak or express yourself. When should you call for help? Call 911 anytime you think you may need emergency care. For example, call if:    · You passed out (lost consciousness).    Call your doctor now or seek immediate medical care if:    · You have new or worse pain.     · You have new symptoms, such as a cough, belly pain, vomiting, diarrhea, or a rash.     · You have symptoms of a urinary tract infection. For example:  ? You have blood or pus in your urine. ? You have pain in your back just below your rib cage. This is called flank pain. ? You have a fever, chills, or body aches. ? It hurts to urinate. ? You have groin or belly pain.    Watch closely for changes in your health, and be sure to contact your doctor if:    · You have swollen glands in your armpits, groin, or neck.     · You have trouble controlling your urine.     · You do not get better as expected. Where can you learn more? Go to https://latoya.[employer]. org and sign in to your Jimdo account.  Enter V141 in the Connectbeam box to learn more about \"Prostate Cancer: Care Instructions. \"     If you do not have an account, please click on the \"Sign Up Now\" link. Current as of: August 21, 2019  Content Version: 12.3  © 1582-9285 Healthwise, Incorporated. Care instructions adapted under license by [facility] ([facility]). If you have questions about a medical condition or this instruction, always ask your healthcare professional. [address] any warranty or liability for your use of this information.

## 2020-03-10 NOTE — PROGRESS NOTES
Subjective:      Patient ID: JOVANA Rodriguez is a 44 y.o. male who presents today with:  Chief Complaint   Patient presents with    Follow-up     6 week f/u on leukopenia       HPI    History of prostate cancer, due for yearly psa.    Past Medical History:   Diagnosis Date    Allergic rhinitis     Cancer ([address] 2/12    prostate    Hyperlipidemia     borderline    PVC (premature ventricular contraction)     benign     Past Surgical History:   Procedure Laterality Date    COLONOSCOPY N/A 2/19/2020    COLORECTAL CANCER SCREENING, NOT HIGH RISK performed by Juli Costello MD at [address]    buttocks    FINGER SURGERY      left thumb, game keepers    KNEE SURGERY      left, meniscal x 2     PROSTATECTOMY  2/12    cancer    TONSILLECTOMY  age 8     Social History     Socioeconomic History    Marital status:      Spouse name: Not on file    Number of children: Not on file    Years of education: Not on file    Highest education level: Not on file   Occupational History    Not on file   Social Needs    Financial resource strain: Not on file    Food insecurity     Worry: Not on file     Inability: Not on file    Transportation needs     Medical: Not on file     Non-medical: Not on file   Tobacco Use    Smoking status: Never Smoker    Smokeless tobacco: Former User   Substance and Sexual Activity    Alcohol use: Yes     Comment: one daily    Drug use: No    Sexual activity: Yes     Partners: Female   Lifestyle    Physical activity     Days per week: Not on file     Minutes per session: Not on file    Stress: Not on file   Relationships    Social connections     Talks on phone: Not on file     Gets together: Not on file     Attends Holiness service: Not on file     Active member of club or organization: Not on file     Attends meetings of clubs or organizations: Not on file     Relationship status: Not on file    Intimate partner violence     Fear of current or ex partner: Not Heart sounds: Normal heart sounds. No murmur. No friction rub. No gallop. Pulmonary:      Effort: No respiratory distress. Abdominal:      General: Bowel sounds are normal. There is no distension. Palpations: Abdomen is soft. Tenderness: There is no rebound. Skin:     General: Skin is warm and dry. Neurological:      Mental Status: He is oriented to person, place, and time. Assessment:       Diagnosis Orders   1. History of prostate cancer  PSA, Diagnostic         Plan:   Vc minus mild htn in doctor office   He runs lower at home, call if  >130/80   Never had dexa (he had to cancel, but will reschedule)   10 year risk of ascvd IS 3.5%, LIFE style changes for now. See dentist q6 months  Get colonoscopy records (scanned in wrong area? )   Feeling better on ertapenem. PT twice a week. Needs psa ordered. Orders Placed This Encounter   Procedures    PSA, Diagnostic     Standing Status:   Future     Standing Expiration Date:   3/10/2021     No orders of the defined types were placed in this encounter. No follow-ups on file. Richmond Condon MD    If anything should change or worsen call ASAP, don't wait for next scheduled appointment.

## 2020-03-11 ENCOUNTER — OFFICE VISIT (OUTPATIENT)
Dept: INFECTIOUS DISEASES | Age: 50
End: 2020-03-11
Payer: COMMERCIAL

## 2020-03-11 VITALS
BODY MASS INDEX: 27.13 KG/M2 | SYSTOLIC BLOOD PRESSURE: 122 MMHG | DIASTOLIC BLOOD PRESSURE: 79 MMHG | HEIGHT: 74 IN | WEIGHT: 211.4 LBS | HEART RATE: 69 BPM | TEMPERATURE: 98.3 F | RESPIRATION RATE: 20 BRPM

## 2020-03-11 PROCEDURE — G8427 DOCREV CUR MEDS BY ELIG CLIN: HCPCS | Performed by: INTERNAL MEDICINE

## 2020-03-11 PROCEDURE — 99214 OFFICE O/P EST MOD 30 MIN: CPT | Performed by: INTERNAL MEDICINE

## 2020-03-11 PROCEDURE — G8419 CALC BMI OUT NRM PARAM NOF/U: HCPCS | Performed by: INTERNAL MEDICINE

## 2020-03-11 PROCEDURE — 3017F COLORECTAL CA SCREEN DOC REV: CPT | Performed by: INTERNAL MEDICINE

## 2020-03-11 PROCEDURE — 1036F TOBACCO NON-USER: CPT | Performed by: INTERNAL MEDICINE

## 2020-03-11 PROCEDURE — G8482 FLU IMMUNIZE ORDER/ADMIN: HCPCS | Performed by: INTERNAL MEDICINE

## 2020-03-11 RX ORDER — SULFAMETHOXAZOLE AND TRIMETHOPRIM 800; 160 MG/1; MG/1
1 TABLET ORAL DAILY
Qty: 60 TABLET | Refills: 1 | Status: SHIPPED
Start: 2020-03-11 | End: 2020-04-06

## 2020-03-11 NOTE — PROGRESS NOTES
Substance and Sexual Activity    Alcohol use: Yes     Comment: one daily    Drug use: No    Sexual activity: Yes     Partners: Female   Lifestyle    Physical activity     Days per week: Not on file     Minutes per session: Not on file    Stress: Not on file   Relationships    Social connections     Talks on phone: Not on file     Gets together: Not on file     Attends Hinduism service: Not on file     Active member of club or organization: Not on file     Attends meetings of clubs or organizations: Not on file     Relationship status: Not on file    Intimate partner violence     Fear of current or ex partner: Not on file     Emotionally abused: Not on file     Physically abused: Not on file     Forced sexual activity: Not on file   Other Topics Concern    Not on file   Social History Narrative    Not on file       Family History   Problem Relation Age of Onset    No Known Problems Mother         no FDR with aneurysms or valvae problems     Cancer Father         prostate    Cancer Paternal Grandfather         prostate    No Known Problems Sister        Current Outpatient Medications on File Prior to Visit   Medication Sig Dispense Refill    sodium chloride 0.9 % SOLN 50 mL with ertapenem 1 GM SOLR 1 g Inject 1,000 mg into the muscle      ertapenem (INVANZ) 1 GM injection Inject 1,000 mg into the muscle every 24 hours      alendronate (FOSAMAX) 70 MG tablet Take 70 mg by mouth every 7 days       No current facility-administered medications on file prior to visit. Physical Exam:  General: Patient appears in no acute distress, cooperative  Skin: no new rashes or erythema or induration  HEENT: EOMI, MMM, Neck is supple  Heart: S1 S2  Lungs: bilaterally clear to auscultation  Abdomen: soft, ND, NTTP, +BS  Extrem:+pulses, no calf pain, right leg and ankle area as above. No surrounding erythema or cellulitis. No foul smell. No real pain to palpation.    Neuro exam: CN II-XII intact, facial

## 2020-03-17 ENCOUNTER — TELEPHONE (OUTPATIENT)
Dept: WOUND CARE | Age: 50
End: 2020-03-17

## 2020-03-18 NOTE — TELEPHONE ENCOUNTER
Dr. Vanessa Nina maybe only you can review this is see what she is talking about but I'M not seeing where I can click on anything for report.

## 2020-03-27 ENCOUNTER — TELEPHONE (OUTPATIENT)
Dept: INFECTIOUS DISEASES | Age: 50
End: 2020-03-27

## 2020-03-27 RX ORDER — DOXYCYCLINE HYCLATE 100 MG
100 TABLET ORAL 2 TIMES DAILY
Qty: 60 TABLET | Refills: 0 | Status: SHIPPED | OUTPATIENT
Start: 2020-03-27 | End: 2020-04-26

## 2020-04-20 ENCOUNTER — TELEPHONE (OUTPATIENT)
Dept: INFECTIOUS DISEASES | Age: 50
End: 2020-04-20

## 2020-04-20 NOTE — TELEPHONE ENCOUNTER
Per . Abel for cath elia. Faxed to [facility] and [facility]. Dionicio Jackeline spoke with her let her know faxed cath elia order and she Verbalized Understanding.

## 2020-04-29 ENCOUNTER — VIRTUAL VISIT (OUTPATIENT)
Dept: INFECTIOUS DISEASES | Age: 50
End: 2020-04-29
Payer: COMMERCIAL

## 2020-04-29 PROCEDURE — 3017F COLORECTAL CA SCREEN DOC REV: CPT | Performed by: INTERNAL MEDICINE

## 2020-04-29 PROCEDURE — G8419 CALC BMI OUT NRM PARAM NOF/U: HCPCS | Performed by: INTERNAL MEDICINE

## 2020-04-29 PROCEDURE — 99213 OFFICE O/P EST LOW 20 MIN: CPT | Performed by: INTERNAL MEDICINE

## 2020-04-29 PROCEDURE — 1036F TOBACCO NON-USER: CPT | Performed by: INTERNAL MEDICINE

## 2020-04-29 PROCEDURE — G8427 DOCREV CUR MEDS BY ELIG CLIN: HCPCS | Performed by: INTERNAL MEDICINE

## 2020-05-22 ENCOUNTER — VIRTUAL VISIT (OUTPATIENT)
Dept: INFECTIOUS DISEASES | Age: 50
End: 2020-05-22
Payer: COMMERCIAL

## 2020-05-22 PROCEDURE — G8428 CUR MEDS NOT DOCUMENT: HCPCS | Performed by: INTERNAL MEDICINE

## 2020-05-22 PROCEDURE — 1036F TOBACCO NON-USER: CPT | Performed by: INTERNAL MEDICINE

## 2020-05-22 PROCEDURE — 3017F COLORECTAL CA SCREEN DOC REV: CPT | Performed by: INTERNAL MEDICINE

## 2020-05-22 PROCEDURE — 99213 OFFICE O/P EST LOW 20 MIN: CPT | Performed by: INTERNAL MEDICINE

## 2020-05-22 PROCEDURE — G8419 CALC BMI OUT NRM PARAM NOF/U: HCPCS | Performed by: INTERNAL MEDICINE

## 2020-05-22 RX ORDER — CEPHALEXIN 500 MG/1
500 CAPSULE ORAL 3 TIMES DAILY
Qty: 90 CAPSULE | Refills: 1 | Status: SHIPPED | OUTPATIENT
Start: 2020-05-22 | End: 2020-06-21

## 2020-05-22 NOTE — PROGRESS NOTES
Subjective:      Patient ID: JOVANA Wood is a 44 y.o. male. HPI   Patient was informed that this is a billable visit. I am in my office, patient is at home. Doxy. me was used for communication and exam. Chart was reviewed and labs/ X Rays were discussed with the patient. our practice is making every effort to adhere to current recommendations, including social distancing. For the health and safety of our patients, and to prevent unnecessary exposure, we are currently scheduling telephone appointments. Patient was informed that these appointments are official appointments and will be billed through patient's insurance . Patient confirms this and agreed to the televised visit. Time spend in review of chart and on the IPAD using Doxy. me was 15  minutes. F/U R ankle acute osteomyelitis, recurrent  MSSA infection  H/O polymicrobial infection since January  Allergic reaction to Bactrim  Currently on IV Meropenem, S/P harware removal done at [facility] by Dr Semaus Martinez on 04/08. Had IV antibiotics course followed by PO and when he was off antibiotics for 1 week, he had an abscess with + Cx done on 04/06 +ve for MSSA. Wound opened back up after sutures were removed  No drainage, no pain  Review of Systems   All other systems reviewed and are negative. Objective:   Physical Exam  HENT:      Nose: No congestion. Eyes:      General: No scleral icterus. Pulmonary:      Effort: Pulmonary effort is normal. No respiratory distress. Abdominal:      General: There is no distension. Musculoskeletal:         General: No swelling. Skin:     Findings: No erythema or rash. Neurological:      Mental Status: He is alert and oriented to person, place, and time. Psychiatric:         Mood and Affect: Mood normal.         Behavior: Behavior normal.       R ankle medial aspect incision 2-3 mm width and depth, 1.5 cm length, no drainage.    05/11  WBC=3.35, Hb=13.4  Pik=890  CRP=0.5  Cr=0.86  Assessment:      R ankle acute osteomyelitis, recurrent  MSSA infection  H/O polymicrobial infection since January  Allergic reaction to Bactrim      Plan:      D/C Meropenem  D/C PICC   Keflex 500 mg TID until wound is closed  F/U in 4-6 weeks        Fina Montoya MD

## 2021-01-11 ENCOUNTER — OFFICE VISIT (OUTPATIENT)
Dept: FAMILY MEDICINE CLINIC | Age: 51
End: 2021-01-11
Payer: COMMERCIAL

## 2021-01-11 DIAGNOSIS — R05.3 CHRONIC COUGH: ICD-10-CM

## 2021-01-11 DIAGNOSIS — L98.9 SCALP LESION: ICD-10-CM

## 2021-01-11 DIAGNOSIS — M00.9 INFECTION OF RIGHT KNEE (HCC): Primary | ICD-10-CM

## 2021-01-11 PROCEDURE — 3017F COLORECTAL CA SCREEN DOC REV: CPT | Performed by: FAMILY MEDICINE

## 2021-01-11 PROCEDURE — 99213 OFFICE O/P EST LOW 20 MIN: CPT | Performed by: FAMILY MEDICINE

## 2021-01-11 PROCEDURE — G8427 DOCREV CUR MEDS BY ELIG CLIN: HCPCS | Performed by: FAMILY MEDICINE

## 2021-01-11 PROCEDURE — G8484 FLU IMMUNIZE NO ADMIN: HCPCS | Performed by: FAMILY MEDICINE

## 2021-01-11 PROCEDURE — G8419 CALC BMI OUT NRM PARAM NOF/U: HCPCS | Performed by: FAMILY MEDICINE

## 2021-01-11 PROCEDURE — 1036F TOBACCO NON-USER: CPT | Performed by: FAMILY MEDICINE

## 2021-01-11 RX ORDER — IBUPROFEN 600 MG/1
TABLET ORAL
COMMUNITY
Start: 2020-12-05

## 2021-01-11 RX ORDER — CEPHALEXIN 500 MG/1
CAPSULE ORAL
COMMUNITY
Start: 2020-12-04

## 2021-01-11 ASSESSMENT — PATIENT HEALTH QUESTIONNAIRE - PHQ9
1. LITTLE INTEREST OR PLEASURE IN DOING THINGS: 0
2. FEELING DOWN, DEPRESSED OR HOPELESS: 0
SUM OF ALL RESPONSES TO PHQ9 QUESTIONS 1 & 2: 0
SUM OF ALL RESPONSES TO PHQ QUESTIONS 1-9: 0

## 2021-01-11 NOTE — PROGRESS NOTES
Subjective:      Patient ID: JOVANA Machado is a 44 y.o. male who presents today for:     Chief Complaint   Patient presents with   [address]     patient is following up after left knee surgery, patient had a bump on his head, but it went away, patient is also here for a dry cough it has been going on for about 5 weeks now. patient would like to establish here today as a new patient. HPI  Patient is a very pleasant 64-year-old male presents today to establish care. He is currently on IV antibiotics secondary to a complicated infection after a partial knee replacement. 3 days ago he noticed a lump on the occipital area of his scalp that was nontender, nonpainful with no overlying skin changes. He states that the area only lasted a day and has now completely resolved. He thought the symptoms may be secondary to his antibiotics and called his orthopedic surgeon yesterday and stopped his antibiotic as he was coming close to the end of his course. He has a follow-up with orthopedic surgery tomorrow and infectious disease later on in the week. He denies any fever or chills and states that swelling in the is minimal  Patient also complains of an occasional cough that has been present over the past few weeks but has improved since discontinuance of antibiotics.   He states that the cough is nonproductive and only intermittent in nature  Past Medical History:   Diagnosis Date    Allergic rhinitis     Cancer ([address] 2/12    prostate    Hyperlipidemia     borderline    PVC (premature ventricular contraction)     benign     Past Surgical History:   Procedure Laterality Date    COLONOSCOPY N/A 2/19/2020    COLORECTAL CANCER SCREENING, NOT HIGH RISK performed by Ayan Albarran MD at [address]    buttocks    FINGER SURGERY      left thumb, game keepers    KNEE SURGERY      left, meniscal x 2     KNEE SURGERY Left 10/29/2020    partial left knee replacement    PROSTATECTOMY  2/12 cancer    TONSILLECTOMY  age 8     Family History   Problem Relation Age of Onset    No Known Problems Mother         no FDR with aneurysms or valvae problems     Cancer Father         prostate    Cancer Paternal Grandfather         prostate    No Known Problems Sister      Social History     Socioeconomic History    Marital status:      Spouse name: Not on file    Number of children: Not on file    Years of education: Not on file    Highest education level: Not on file   Occupational History    Not on file   Social Needs    Financial resource strain: Not on file    Food insecurity     Worry: Not on file     Inability: Not on file    Transportation needs     Medical: Not on file     Non-medical: Not on file   Tobacco Use    Smoking status: Never Smoker    Smokeless tobacco: Former User   Substance and Sexual Activity    Alcohol use: Yes     Comment: one daily    Drug use: No    Sexual activity: Yes     Partners: Female   Lifestyle    Physical activity     Days per week: Not on file     Minutes per session: Not on file    Stress: Not on file   Relationships    Social connections     Talks on phone: Not on file     Gets together: Not on file     Attends Taoism service: Not on file     Active member of club or organization: Not on file     Attends meetings of clubs or organizations: Not on file     Relationship status: Not on file    Intimate partner violence     Fear of current or ex partner: Not on file     Emotionally abused: Not on file     Physically abused: Not on file     Forced sexual activity: Not on file   Other Topics Concern    Not on file   Social History Narrative    Not on file     Current Outpatient Medications on File Prior to Visit   Medication Sig Dispense Refill    rifAMPin (RIFADIN) 300 MG capsule TAKE 1 CAPSULE BY MOUTH TWICE A DAY      cephALEXin (KEFLEX) 500 MG capsule Take by mouth      ibuprofen (ADVIL;MOTRIN) 600 MG tablet TAKE 1 TABLET BY MOUTH THREE TIMES DAILY WITH FOOD FOR UP TO 3 WEEKS AFTER SERGERY       No current facility-administered medications on file prior to visit. Allergies:  Bactrim [sulfamethoxazole-trimethoprim], Hydrocodone, Varenicline, and Vicodin [hydrocodone-acetaminophen]    Review of Systems   Constitutional: Negative for activity change, appetite change and fatigue. Respiratory: Positive for cough. Negative for apnea, chest tightness and shortness of breath. Cardiovascular: Negative for chest pain, palpitations and leg swelling. Gastrointestinal: Negative for abdominal pain, blood in stool, constipation, diarrhea, nausea and vomiting. Musculoskeletal: Positive for arthralgias. Skin:        swelling   Neurological: Negative for seizures and headaches. Psychiatric/Behavioral: Negative for hallucinations and suicidal ideas. Objective:   /70 (Site: Right Upper Arm, Position: Sitting, Cuff Size: Large Adult)   Pulse 90   Temp 98 °F (36.7 °C) (Temporal)   Resp 16   Ht 6' 2\" (1.88 m)   Wt 200 lb (90.7 kg)   SpO2 97%   BMI 25.68 kg/m²     Physical Exam  Vitals signs and nursing note reviewed. Constitutional:       General: He is not in acute distress. Appearance: Normal appearance. He is well-developed. He is not diaphoretic. HENT:      Head: Normocephalic and atraumatic. Nose: Nose normal.      Mouth/Throat:      Mouth: Mucous membranes are moist.      Pharynx: Oropharynx is clear. Eyes:      Conjunctiva/sclera: Conjunctivae normal.      Pupils: Pupils are equal, round, and reactive to light. Neck:      Musculoskeletal: Normal range of motion. Cardiovascular:      Rate and Rhythm: Normal rate and regular rhythm. Heart sounds: Normal heart sounds. No murmur. No friction rub. No gallop. Pulmonary:      Effort: Pulmonary effort is normal. No respiratory distress. Breath sounds: Normal breath sounds. No wheezing or rales. Chest:      Chest wall: No tenderness.    Abdominal: General: Abdomen is flat. Bowel sounds are normal.      Palpations: Abdomen is soft. Tenderness: There is no abdominal tenderness. Musculoskeletal:      Left knee: He exhibits decreased range of motion. Legs:    Skin:     General: Skin is warm and dry. Neurological:      Mental Status: He is alert and oriented to person, place, and time. Psychiatric:         Behavior: Behavior normal.         Thought Content: Thought content normal.         Judgment: Judgment normal.         Assessment & Plan:     1. Infection of right knee [facility])  Follow-up with orthopedic surgery and infectious disease    2. Scalp lesion  Completely resolved may consider ultrasound and/or referral to dermatology if symptoms return    3. Chronic cough  If symptoms do not improve will obtain a chest x-ray to rule out infection  - XR CHEST STANDARD (2 VW); Future      Return in about 2 months (around 3/11/2021).     Patti Matos MD

## 2021-01-14 VITALS
SYSTOLIC BLOOD PRESSURE: 112 MMHG | HEART RATE: 90 BPM | OXYGEN SATURATION: 97 % | BODY MASS INDEX: 25.67 KG/M2 | RESPIRATION RATE: 16 BRPM | WEIGHT: 200 LBS | HEIGHT: 74 IN | TEMPERATURE: 98 F | DIASTOLIC BLOOD PRESSURE: 70 MMHG

## 2021-01-14 ASSESSMENT — ENCOUNTER SYMPTOMS
DIARRHEA: 0
SHORTNESS OF BREATH: 0
APNEA: 0
VOMITING: 0
COUGH: 1
ROS SKIN COMMENTS: SWELLING
CHEST TIGHTNESS: 0
CONSTIPATION: 0
ABDOMINAL PAIN: 0
BLOOD IN STOOL: 0
NAUSEA: 0

## 2022-09-16 ENCOUNTER — OFFICE VISIT (OUTPATIENT)
Dept: FAMILY MEDICINE CLINIC | Age: 52
End: 2022-09-16
Payer: COMMERCIAL

## 2022-09-16 VITALS
DIASTOLIC BLOOD PRESSURE: 92 MMHG | BODY MASS INDEX: 26.54 KG/M2 | HEART RATE: 71 BPM | OXYGEN SATURATION: 99 % | TEMPERATURE: 98 F | HEIGHT: 74 IN | WEIGHT: 206.8 LBS | SYSTOLIC BLOOD PRESSURE: 118 MMHG

## 2022-09-16 DIAGNOSIS — L98.9 SKIN LESION OF FACE: Primary | ICD-10-CM

## 2022-09-16 PROCEDURE — 99213 OFFICE O/P EST LOW 20 MIN: CPT | Performed by: FAMILY MEDICINE

## 2022-09-16 PROCEDURE — 3017F COLORECTAL CA SCREEN DOC REV: CPT | Performed by: FAMILY MEDICINE

## 2022-09-16 PROCEDURE — 1036F TOBACCO NON-USER: CPT | Performed by: FAMILY MEDICINE

## 2022-09-16 PROCEDURE — G8419 CALC BMI OUT NRM PARAM NOF/U: HCPCS | Performed by: FAMILY MEDICINE

## 2022-09-16 PROCEDURE — G8427 DOCREV CUR MEDS BY ELIG CLIN: HCPCS | Performed by: FAMILY MEDICINE

## 2022-09-16 SDOH — ECONOMIC STABILITY: FOOD INSECURITY: WITHIN THE PAST 12 MONTHS, YOU WORRIED THAT YOUR FOOD WOULD RUN OUT BEFORE YOU GOT MONEY TO BUY MORE.: NEVER TRUE

## 2022-09-16 SDOH — ECONOMIC STABILITY: FOOD INSECURITY: WITHIN THE PAST 12 MONTHS, THE FOOD YOU BOUGHT JUST DIDN'T LAST AND YOU DIDN'T HAVE MONEY TO GET MORE.: NEVER TRUE

## 2022-09-16 ASSESSMENT — PATIENT HEALTH QUESTIONNAIRE - PHQ9
6. FEELING BAD ABOUT YOURSELF - OR THAT YOU ARE A FAILURE OR HAVE LET YOURSELF OR YOUR FAMILY DOWN: 0
1. LITTLE INTEREST OR PLEASURE IN DOING THINGS: 0
SUM OF ALL RESPONSES TO PHQ QUESTIONS 1-9: 0
SUM OF ALL RESPONSES TO PHQ QUESTIONS 1-9: 0
8. MOVING OR SPEAKING SO SLOWLY THAT OTHER PEOPLE COULD HAVE NOTICED. OR THE OPPOSITE, BEING SO FIGETY OR RESTLESS THAT YOU HAVE BEEN MOVING AROUND A LOT MORE THAN USUAL: 0
10. IF YOU CHECKED OFF ANY PROBLEMS, HOW DIFFICULT HAVE THESE PROBLEMS MADE IT FOR YOU TO DO YOUR WORK, TAKE CARE OF THINGS AT HOME, OR GET ALONG WITH OTHER PEOPLE: 0
7. TROUBLE CONCENTRATING ON THINGS, SUCH AS READING THE NEWSPAPER OR WATCHING TELEVISION: 0
SUM OF ALL RESPONSES TO PHQ QUESTIONS 1-9: 0
2. FEELING DOWN, DEPRESSED OR HOPELESS: 0
9. THOUGHTS THAT YOU WOULD BE BETTER OFF DEAD, OR OF HURTING YOURSELF: 0
SUM OF ALL RESPONSES TO PHQ QUESTIONS 1-9: 0
5. POOR APPETITE OR OVEREATING: 0
3. TROUBLE FALLING OR STAYING ASLEEP: 0
4. FEELING TIRED OR HAVING LITTLE ENERGY: 0
SUM OF ALL RESPONSES TO PHQ9 QUESTIONS 1 & 2: 0

## 2022-09-16 ASSESSMENT — SOCIAL DETERMINANTS OF HEALTH (SDOH): HOW HARD IS IT FOR YOU TO PAY FOR THE VERY BASICS LIKE FOOD, HOUSING, MEDICAL CARE, AND HEATING?: NOT HARD AT ALL

## 2022-09-16 NOTE — PROGRESS NOTES
Subjective:      Patient ID: JOVANA Jean is a 45 y.o. male who presents today for:     Chief Complaint   Patient presents with    Skin Problem     Spot on tip of nose onset beginning of summer, spot on hairline feels like a scab x2-3 years        HPI  Patient is a very pleasant 49-year-old male presents today due to a skin lesion that he has noticed on the tip of his nose that has been present for approximately 3 months. He states it has not changed in character or size. He also has noticed an rough area on his left upper forehead. He states that it may become smooth when he shaves his head however the roughness always returns.   Past Medical History:   Diagnosis Date    Allergic rhinitis     Cancer ([address] 2/12    prostate    Hyperlipidemia     borderline    PVC (premature ventricular contraction)     benign     Past Surgical History:   Procedure Laterality Date    COLONOSCOPY N/A 2/19/2020    COLORECTAL CANCER SCREENING, NOT HIGH RISK performed by Jesus Moreland MD at [address] [place] [address]    buttocks    FINGER SURGERY      left thumb, game keepers    KNEE SURGERY      left, meniscal x 2     KNEE SURGERY Left 10/29/2020    partial left knee replacement    PROSTATECTOMY  2/12    cancer    TONSILLECTOMY  age 8     Family History   Problem Relation Age of Onset    No Known Problems Mother         no FDR with aneurysms or valvae problems     Cancer Father         prostate    Cancer Paternal Grandfather         prostate    No Known Problems Sister      Social History     Socioeconomic History    Marital status:      Spouse name: Not on file    Number of children: Not on file    Years of education: Not on file    Highest education level: Not on file   Occupational History    Not on file   Tobacco Use    Smoking status: Never    Smokeless tobacco: Former   Substance and Sexual Activity    Alcohol use: Yes     Comment: one daily    Drug use: No    Sexual activity: Yes     Partners: Female   Other Topics Concern    Not on file   Social History Narrative    Not on file     Social Determinants of Health     Financial Resource Strain: Low Risk     Difficulty of Paying Living Expenses: Not hard at all   Food Insecurity: No Food Insecurity    Worried About Running Out of Food in the Last Year: Never true    Ran Out of Food in the Last Year: Never true   Transportation Needs: Not on file   Physical Activity: Not on file   Stress: Not on file   Social Connections: Not on file   Intimate Partner Violence: Not on file   Housing Stability: Not on file     Current Outpatient Medications on File Prior to Visit   Medication Sig Dispense Refill    rifAMPin (RIFADIN) 300 MG capsule TAKE 1 CAPSULE BY MOUTH TWICE A DAY (Patient not taking: Reported on 9/16/2022)      cephALEXin (KEFLEX) 500 MG capsule Take by mouth (Patient not taking: Reported on 9/16/2022)      ibuprofen (ADVIL;MOTRIN) 600 MG tablet TAKE 1 TABLET BY MOUTH THREE TIMES DAILY WITH FOOD FOR UP TO 3 WEEKS AFTER SERGERY (Patient not taking: Reported on 9/16/2022)       No current facility-administered medications on file prior to visit. Allergies:  Bactrim [sulfamethoxazole-trimethoprim], Hydrocodone, Varenicline, and Vicodin [hydrocodone-acetaminophen]    Review of Systems   Constitutional:  Negative for activity change, appetite change and fatigue. Respiratory:  Negative for apnea, cough, chest tightness and shortness of breath. Cardiovascular:  Negative for chest pain, palpitations and leg swelling. Gastrointestinal:  Negative for abdominal pain, blood in stool, constipation, diarrhea, nausea and vomiting. Musculoskeletal:  Negative for arthralgias. Skin:  Positive for rash. Neurological:  Negative for seizures and headaches. Psychiatric/Behavioral:  Negative for hallucinations and suicidal ideas.       Objective:   BP (!) 118/92   Pulse 71   Temp 98 °F (36.7 °C) (Infrared)   Ht 6' 2\" (1.88 m)   Wt 206 lb 12.8 oz (93.8 kg)   SpO2 99%   BMI 26.55 kg/m²     Physical Exam  Vitals and nursing note reviewed. Constitutional:       General: He is not in acute distress. Appearance: Normal appearance. He is well-developed. He is not diaphoretic. HENT:      Head: Normocephalic and atraumatic. Nose: Nose normal.      Mouth/Throat:      Mouth: Mucous membranes are moist.      Pharynx: Oropharynx is clear. Eyes:      Conjunctiva/sclera: Conjunctivae normal.      Pupils: Pupils are equal, round, and reactive to light. Cardiovascular:      Rate and Rhythm: Normal rate and regular rhythm. Heart sounds: Normal heart sounds. No murmur heard. No friction rub. No gallop. Pulmonary:      Effort: Pulmonary effort is normal. No respiratory distress. Breath sounds: Normal breath sounds. No wheezing or rales. Chest:      Chest wall: No tenderness. Abdominal:      General: Abdomen is flat. Bowel sounds are normal.      Palpations: Abdomen is soft. Tenderness: There is no abdominal tenderness. Musculoskeletal:      Cervical back: Normal range of motion. Skin:     General: Skin is warm and dry. Neurological:      Mental Status: He is alert and oriented to person, place, and time. Psychiatric:         Behavior: Behavior normal.         Thought Content: Thought content normal.         Judgment: Judgment normal.       Assessment & Plan:     1. Skin lesion of face  May be Secondary to actinic keratosis, will have patient evaluated by dermatology      - Nemo Bueno MD, Dermatology, [facility]    No follow-ups on file.     Amadou Larsen MD

## 2022-09-18 ASSESSMENT — ENCOUNTER SYMPTOMS
ABDOMINAL PAIN: 0
BLOOD IN STOOL: 0
APNEA: 0
CHEST TIGHTNESS: 0
DIARRHEA: 0
COUGH: 0
SHORTNESS OF BREATH: 0
VOMITING: 0
CONSTIPATION: 0
NAUSEA: 0

## 2023-06-19 ENCOUNTER — OFFICE VISIT (OUTPATIENT)
Dept: FAMILY MEDICINE CLINIC | Age: 53
End: 2023-06-19
Payer: COMMERCIAL

## 2023-06-19 VITALS
HEIGHT: 74 IN | HEART RATE: 83 BPM | SYSTOLIC BLOOD PRESSURE: 126 MMHG | OXYGEN SATURATION: 99 % | WEIGHT: 206.4 LBS | DIASTOLIC BLOOD PRESSURE: 84 MMHG | BODY MASS INDEX: 26.49 KG/M2 | TEMPERATURE: 98.8 F

## 2023-06-19 DIAGNOSIS — J01.40 ACUTE NON-RECURRENT PANSINUSITIS: Primary | ICD-10-CM

## 2023-06-19 PROCEDURE — 1036F TOBACCO NON-USER: CPT

## 2023-06-19 PROCEDURE — 3017F COLORECTAL CA SCREEN DOC REV: CPT

## 2023-06-19 PROCEDURE — 99213 OFFICE O/P EST LOW 20 MIN: CPT

## 2023-06-19 PROCEDURE — G8419 CALC BMI OUT NRM PARAM NOF/U: HCPCS

## 2023-06-19 PROCEDURE — G8427 DOCREV CUR MEDS BY ELIG CLIN: HCPCS

## 2023-06-19 RX ORDER — AMOXICILLIN AND CLAVULANATE POTASSIUM 875; 125 MG/1; MG/1
1 TABLET, FILM COATED ORAL 2 TIMES DAILY
Qty: 20 TABLET | Refills: 0 | Status: SHIPPED | OUTPATIENT
Start: 2023-06-19 | End: 2023-06-29

## 2023-06-19 SDOH — ECONOMIC STABILITY: HOUSING INSECURITY
IN THE LAST 12 MONTHS, WAS THERE A TIME WHEN YOU DID NOT HAVE A STEADY PLACE TO SLEEP OR SLEPT IN A SHELTER (INCLUDING NOW)?: NO

## 2023-06-19 SDOH — ECONOMIC STABILITY: FOOD INSECURITY: WITHIN THE PAST 12 MONTHS, YOU WORRIED THAT YOUR FOOD WOULD RUN OUT BEFORE YOU GOT MONEY TO BUY MORE.: NEVER TRUE

## 2023-06-19 SDOH — ECONOMIC STABILITY: FOOD INSECURITY: WITHIN THE PAST 12 MONTHS, THE FOOD YOU BOUGHT JUST DIDN'T LAST AND YOU DIDN'T HAVE MONEY TO GET MORE.: NEVER TRUE

## 2023-06-19 SDOH — ECONOMIC STABILITY: INCOME INSECURITY: HOW HARD IS IT FOR YOU TO PAY FOR THE VERY BASICS LIKE FOOD, HOUSING, MEDICAL CARE, AND HEATING?: NOT HARD AT ALL

## 2023-06-19 ASSESSMENT — PATIENT HEALTH QUESTIONNAIRE - PHQ9
SUM OF ALL RESPONSES TO PHQ QUESTIONS 1-9: 0
1. LITTLE INTEREST OR PLEASURE IN DOING THINGS: 0
SUM OF ALL RESPONSES TO PHQ QUESTIONS 1-9: 0
3. TROUBLE FALLING OR STAYING ASLEEP: 0
2. FEELING DOWN, DEPRESSED OR HOPELESS: 0
6. FEELING BAD ABOUT YOURSELF - OR THAT YOU ARE A FAILURE OR HAVE LET YOURSELF OR YOUR FAMILY DOWN: 0
SUM OF ALL RESPONSES TO PHQ QUESTIONS 1-9: 0
5. POOR APPETITE OR OVEREATING: 0
9. THOUGHTS THAT YOU WOULD BE BETTER OFF DEAD, OR OF HURTING YOURSELF: 0
4. FEELING TIRED OR HAVING LITTLE ENERGY: 0
10. IF YOU CHECKED OFF ANY PROBLEMS, HOW DIFFICULT HAVE THESE PROBLEMS MADE IT FOR YOU TO DO YOUR WORK, TAKE CARE OF THINGS AT HOME, OR GET ALONG WITH OTHER PEOPLE: 0
SUM OF ALL RESPONSES TO PHQ QUESTIONS 1-9: 0
8. MOVING OR SPEAKING SO SLOWLY THAT OTHER PEOPLE COULD HAVE NOTICED. OR THE OPPOSITE, BEING SO FIGETY OR RESTLESS THAT YOU HAVE BEEN MOVING AROUND A LOT MORE THAN USUAL: 0
SUM OF ALL RESPONSES TO PHQ9 QUESTIONS 1 & 2: 0
7. TROUBLE CONCENTRATING ON THINGS, SUCH AS READING THE NEWSPAPER OR WATCHING TELEVISION: 0

## 2023-06-19 ASSESSMENT — ENCOUNTER SYMPTOMS
ABDOMINAL PAIN: 0
EYES NEGATIVE: 1
SHORTNESS OF BREATH: 0
SINUS PRESSURE: 1
CHEST TIGHTNESS: 0
VOMITING: 0
RHINORRHEA: 0
SORE THROAT: 0
DIARRHEA: 0
COUGH: 1
WHEEZING: 0

## 2023-06-19 ASSESSMENT — COLUMBIA-SUICIDE SEVERITY RATING SCALE - C-SSRS
6. HAVE YOU EVER DONE ANYTHING, STARTED TO DO ANYTHING, OR PREPARED TO DO ANYTHING TO END YOUR LIFE?: NO
1. WITHIN THE PAST MONTH, HAVE YOU WISHED YOU WERE DEAD OR WISHED YOU COULD GO TO SLEEP AND NOT WAKE UP?: NO
2. HAVE YOU ACTUALLY HAD ANY THOUGHTS OF KILLING YOURSELF?: NO

## 2023-06-19 NOTE — PROGRESS NOTES
[facility] PRIMARY CARE          ASSESSMENT/PLAN     D Kehinde Curiel is a 45 y.o. male who presents with:  Purulent nasal drainage with cough starting 2 weeks ago. Reports symptoms began with 4 to 5 days of cough and symptoms in his chest and then moved to his sinuses. Reports cough is result of postnasal drip that is yellow in color. He has taking sudafed for symptoms. Reports lightheaded feeling and pressure in his ears. 1. Acute non-recurrent pansinusitis  -     amoxicillin-clavulanate (AUGMENTIN) 875-125 MG per tablet; Take 1 tablet by mouth 2 times daily for 10 days, Disp-20 tablet, R-0Normal           PATIENT REFERRED TO:  Return if symptoms worsen or fail to improve. DISCHARGE MEDICATIONS:  New Prescriptions    AMOXICILLIN-CLAVULANATE (AUGMENTIN) 875-125 MG PER TABLET    Take 1 tablet by mouth 2 times daily for 10 days     Cannot display discharge medications since this is not an admission. MANDA Hernández - CNP    CHIEF COMPLAINT       Chief Complaint   Patient presents with    Cough     Persistent productive cough x2 weeks          SUBJECTIVE/REVIEW OF SYSTEMS     Review of Systems   Constitutional:  Negative for chills, fatigue and fever. HENT:  Positive for congestion, ear pain and sinus pressure. Negative for rhinorrhea and sore throat. Eyes: Negative. Respiratory:  Positive for cough. Negative for chest tightness, shortness of breath and wheezing. Cardiovascular:  Negative for chest pain. Gastrointestinal:  Negative for abdominal pain, diarrhea and vomiting. Endocrine: Negative. Musculoskeletal:  Negative for arthralgias and myalgias. Skin:  Negative for rash. Neurological:  Negative for dizziness, weakness, light-headedness and headaches. Hematological:  Negative for adenopathy. Psychiatric/Behavioral: Negative. OBJECTIVE/PHYSICAL EXAM     Physical Exam  Constitutional:       Appearance: Normal appearance. HENT:      Head: Normocephalic.

## 2023-11-07 ENCOUNTER — OFFICE VISIT (OUTPATIENT)
Dept: FAMILY MEDICINE CLINIC | Age: 53
End: 2023-11-07
Payer: COMMERCIAL

## 2023-11-07 VITALS
OXYGEN SATURATION: 99 % | SYSTOLIC BLOOD PRESSURE: 130 MMHG | HEART RATE: 67 BPM | BODY MASS INDEX: 26.96 KG/M2 | DIASTOLIC BLOOD PRESSURE: 80 MMHG | TEMPERATURE: 97.4 F | WEIGHT: 210 LBS

## 2023-11-07 DIAGNOSIS — R22.2 ABDOMINAL WALL MASS: ICD-10-CM

## 2023-11-07 DIAGNOSIS — Z12.5 PROSTATE CANCER SCREENING: ICD-10-CM

## 2023-11-07 DIAGNOSIS — Z11.59 NEED FOR HEPATITIS C SCREENING TEST: ICD-10-CM

## 2023-11-07 DIAGNOSIS — Z00.00 ENCOUNTER FOR WELL ADULT EXAM WITHOUT ABNORMAL FINDINGS: Primary | ICD-10-CM

## 2023-11-07 DIAGNOSIS — Z90.79 STATUS POST PROSTATECTOMY: ICD-10-CM

## 2023-11-07 PROCEDURE — G8484 FLU IMMUNIZE NO ADMIN: HCPCS | Performed by: FAMILY MEDICINE

## 2023-11-07 PROCEDURE — 99396 PREV VISIT EST AGE 40-64: CPT | Performed by: FAMILY MEDICINE

## 2023-11-07 RX ORDER — HYDROXYZINE HYDROCHLORIDE 10 MG/1
10 TABLET, FILM COATED ORAL 3 TIMES DAILY PRN
Qty: 45 TABLET | Refills: 0 | Status: SHIPPED | OUTPATIENT
Start: 2023-11-07

## 2023-11-07 RX ORDER — HYDROXYZINE HYDROCHLORIDE 10 MG/1
10 TABLET, FILM COATED ORAL 3 TIMES DAILY PRN
Qty: 45 TABLET | Refills: 0 | Status: SHIPPED | OUTPATIENT
Start: 2023-11-07 | End: 2023-11-07

## 2023-11-09 ENCOUNTER — HOSPITAL ENCOUNTER (OUTPATIENT)
Dept: ULTRASOUND IMAGING | Age: 53
Discharge: HOME | End: 2023-11-11
Payer: COMMERCIAL

## 2023-11-09 DIAGNOSIS — R22.2 ABDOMINAL WALL MASS: ICD-10-CM

## 2023-11-09 PROCEDURE — 76999 ECHO EXAMINATION PROCEDURE: CPT

## 2023-11-12 ASSESSMENT — ENCOUNTER SYMPTOMS
ANAL BLEEDING: 0
SHORTNESS OF BREATH: 0
APNEA: 0
BLOOD IN STOOL: 0
ABDOMINAL DISTENTION: 0
DIARRHEA: 0
EYE ITCHING: 0
CHOKING: 0
VOICE CHANGE: 0
COUGH: 0
CHEST TIGHTNESS: 0
SINUS PRESSURE: 0
EYE PAIN: 0
RHINORRHEA: 0
FACIAL SWELLING: 0
EYE DISCHARGE: 0
ABDOMINAL PAIN: 0
NAUSEA: 0
PHOTOPHOBIA: 0
SORE THROAT: 0
EYE REDNESS: 0
TROUBLE SWALLOWING: 0
CONSTIPATION: 0
WHEEZING: 0
BACK PAIN: 0

## 2023-11-14 DIAGNOSIS — R22.2 ABDOMINAL WALL MASS: Primary | ICD-10-CM

## 2024-06-05 ENCOUNTER — OFFICE VISIT (OUTPATIENT)
Dept: FAMILY MEDICINE CLINIC | Age: 54
End: 2024-06-05
Payer: COMMERCIAL

## 2024-06-05 VITALS
TEMPERATURE: 98.3 F | OXYGEN SATURATION: 100 % | WEIGHT: 196 LBS | BODY MASS INDEX: 25.15 KG/M2 | DIASTOLIC BLOOD PRESSURE: 86 MMHG | HEART RATE: 81 BPM | SYSTOLIC BLOOD PRESSURE: 138 MMHG | HEIGHT: 74 IN

## 2024-06-05 DIAGNOSIS — J06.9 UPPER RESPIRATORY TRACT INFECTION, UNSPECIFIED TYPE: ICD-10-CM

## 2024-06-05 DIAGNOSIS — Z85.46 H/O PROSTATE CANCER: Chronic | ICD-10-CM

## 2024-06-05 PROCEDURE — G8419 CALC BMI OUT NRM PARAM NOF/U: HCPCS | Performed by: INTERNAL MEDICINE

## 2024-06-05 PROCEDURE — 99213 OFFICE O/P EST LOW 20 MIN: CPT | Performed by: INTERNAL MEDICINE

## 2024-06-05 PROCEDURE — 3017F COLORECTAL CA SCREEN DOC REV: CPT | Performed by: INTERNAL MEDICINE

## 2024-06-05 PROCEDURE — G8427 DOCREV CUR MEDS BY ELIG CLIN: HCPCS | Performed by: INTERNAL MEDICINE

## 2024-06-05 PROCEDURE — 1036F TOBACCO NON-USER: CPT | Performed by: INTERNAL MEDICINE

## 2024-06-05 RX ORDER — AZITHROMYCIN 500 MG/1
500 TABLET, FILM COATED ORAL DAILY
Qty: 7 TABLET | Refills: 0 | Status: SHIPPED | OUTPATIENT
Start: 2024-06-05 | End: 2024-06-12

## 2024-06-05 ASSESSMENT — ENCOUNTER SYMPTOMS
CONSTIPATION: 0
COLOR CHANGE: 0
EYE PAIN: 0
NAUSEA: 0
COUGH: 1
BLOOD IN STOOL: 0
PHOTOPHOBIA: 0
ABDOMINAL PAIN: 0
VOICE CHANGE: 0

## 2024-06-05 ASSESSMENT — PATIENT HEALTH QUESTIONNAIRE - PHQ9
SUM OF ALL RESPONSES TO PHQ QUESTIONS 1-9: 0
9. THOUGHTS THAT YOU WOULD BE BETTER OFF DEAD, OR OF HURTING YOURSELF: NOT AT ALL
8. MOVING OR SPEAKING SO SLOWLY THAT OTHER PEOPLE COULD HAVE NOTICED. OR THE OPPOSITE, BEING SO FIGETY OR RESTLESS THAT YOU HAVE BEEN MOVING AROUND A LOT MORE THAN USUAL: NOT AT ALL
SUM OF ALL RESPONSES TO PHQ QUESTIONS 1-9: 0
SUM OF ALL RESPONSES TO PHQ QUESTIONS 1-9: 0
6. FEELING BAD ABOUT YOURSELF - OR THAT YOU ARE A FAILURE OR HAVE LET YOURSELF OR YOUR FAMILY DOWN: NOT AT ALL
SUM OF ALL RESPONSES TO PHQ QUESTIONS 1-9: 0
10. IF YOU CHECKED OFF ANY PROBLEMS, HOW DIFFICULT HAVE THESE PROBLEMS MADE IT FOR YOU TO DO YOUR WORK, TAKE CARE OF THINGS AT HOME, OR GET ALONG WITH OTHER PEOPLE: NOT DIFFICULT AT ALL
1. LITTLE INTEREST OR PLEASURE IN DOING THINGS: NOT AT ALL
3. TROUBLE FALLING OR STAYING ASLEEP: NOT AT ALL
SUM OF ALL RESPONSES TO PHQ9 QUESTIONS 1 & 2: 0
5. POOR APPETITE OR OVEREATING: NOT AT ALL
2. FEELING DOWN, DEPRESSED OR HOPELESS: NOT AT ALL
7. TROUBLE CONCENTRATING ON THINGS, SUCH AS READING THE NEWSPAPER OR WATCHING TELEVISION: NOT AT ALL
4. FEELING TIRED OR HAVING LITTLE ENERGY: NOT AT ALL

## 2024-06-05 ASSESSMENT — COLUMBIA-SUICIDE SEVERITY RATING SCALE - C-SSRS
1. WITHIN THE PAST MONTH, HAVE YOU WISHED YOU WERE DEAD OR WISHED YOU COULD GO TO SLEEP AND NOT WAKE UP?: NO
6. HAVE YOU EVER DONE ANYTHING, STARTED TO DO ANYTHING, OR PREPARED TO DO ANYTHING TO END YOUR LIFE?: NO
2. HAVE YOU ACTUALLY HAD ANY THOUGHTS OF KILLING YOURSELF?: NO

## 2024-06-05 NOTE — PROGRESS NOTES
tablet, Take 1 tablet by mouth daily for 7 days, Disp: 7 tablet, Rfl: 0   Past Medical History:   Diagnosis Date    Allergic rhinitis     Cancer (HCC) 2/12    prostate    Hyperlipidemia     borderline    PVC (premature ventricular contraction)     benign     Past Surgical History:   Procedure Laterality Date    COLONOSCOPY N/A 2/19/2020    COLORECTAL CANCER SCREENING, NOT HIGH RISK performed by Vinny Mehta MD at [facility] OR    CYST REMOVAL  2000    buttocks    FINGER SURGERY      left thumb, game keepers    KNEE SURGERY      left, meniscal x 2     KNEE SURGERY Left 10/29/2020    partial left knee replacement    PROSTATECTOMY  2/12    cancer    TONSILLECTOMY  age 4     Family History   Problem Relation Age of Onset    No Known Problems Mother         no FDR with aneurysms or valvae problems     Cancer Father         prostate    Cancer Paternal Grandfather         prostate    No Known Problems Sister      Social History     Tobacco Use    Smoking status: Never    Smokeless tobacco: Former   Substance Use Topics    Alcohol use: Yes     Comment: one daily       PMH, Surgical Hx, Family Hx, and Social Hx reviewed and updated.  Health Maintenance reviewed.    Reviewed with the patient: current clinical status, medications, activities and diet.     Side effects, adverse effects of the medication prescribed today, as well as treatment plan/ rationale and result expectations have been discussed with the patient who expresses understanding and desires to proceed.  Close follow up to evaluate treatment results and for coordination of care.    I have reviewed the patient's medical history in detail and updated the computerized patient record.    Objective        Objective     Vitals:    06/05/24 1429   BP: 138/86   Site: Right Upper Arm   Position: Sitting   Cuff Size: Medium Adult   Pulse: 81   Temp: 98.3 °F (36.8 °C)   TempSrc: Temporal   SpO2: 100%   Weight: 88.9 kg (196 lb)   Height: 1.88 m (6' 2\")          [facility]

## 2024-09-10 ENCOUNTER — OFFICE VISIT (OUTPATIENT)
Dept: FAMILY MEDICINE CLINIC | Age: 54
End: 2024-09-10
Payer: COMMERCIAL

## 2024-09-10 VITALS
DIASTOLIC BLOOD PRESSURE: 95 MMHG | HEART RATE: 96 BPM | BODY MASS INDEX: 26.14 KG/M2 | WEIGHT: 203.6 LBS | OXYGEN SATURATION: 100 % | SYSTOLIC BLOOD PRESSURE: 148 MMHG | TEMPERATURE: 97.2 F

## 2024-09-10 DIAGNOSIS — N50.811 PAIN IN BOTH TESTICLES: Primary | ICD-10-CM

## 2024-09-10 DIAGNOSIS — R07.9 CHEST PAIN, UNSPECIFIED TYPE: ICD-10-CM

## 2024-09-10 DIAGNOSIS — N50.812 PAIN IN BOTH TESTICLES: Primary | ICD-10-CM

## 2024-09-10 DIAGNOSIS — R03.0 ELEVATED BP WITHOUT DIAGNOSIS OF HYPERTENSION: ICD-10-CM

## 2024-09-10 DIAGNOSIS — R10.30 LOWER ABDOMINAL PAIN: ICD-10-CM

## 2024-09-10 PROCEDURE — 93000 ELECTROCARDIOGRAM COMPLETE: CPT | Performed by: FAMILY MEDICINE

## 2024-09-10 PROCEDURE — 99214 OFFICE O/P EST MOD 30 MIN: CPT | Performed by: FAMILY MEDICINE

## 2024-09-10 PROCEDURE — 1036F TOBACCO NON-USER: CPT | Performed by: FAMILY MEDICINE

## 2024-09-10 PROCEDURE — G8419 CALC BMI OUT NRM PARAM NOF/U: HCPCS | Performed by: FAMILY MEDICINE

## 2024-09-10 PROCEDURE — 3017F COLORECTAL CA SCREEN DOC REV: CPT | Performed by: FAMILY MEDICINE

## 2024-09-10 PROCEDURE — G8427 DOCREV CUR MEDS BY ELIG CLIN: HCPCS | Performed by: FAMILY MEDICINE

## 2024-09-10 SDOH — ECONOMIC STABILITY: INCOME INSECURITY: HOW HARD IS IT FOR YOU TO PAY FOR THE VERY BASICS LIKE FOOD, HOUSING, MEDICAL CARE, AND HEATING?: NOT HARD AT ALL

## 2024-09-10 SDOH — ECONOMIC STABILITY: FOOD INSECURITY: WITHIN THE PAST 12 MONTHS, THE FOOD YOU BOUGHT JUST DIDN'T LAST AND YOU DIDN'T HAVE MONEY TO GET MORE.: NEVER TRUE

## 2024-09-10 SDOH — ECONOMIC STABILITY: FOOD INSECURITY: WITHIN THE PAST 12 MONTHS, YOU WORRIED THAT YOUR FOOD WOULD RUN OUT BEFORE YOU GOT MONEY TO BUY MORE.: NEVER TRUE

## 2024-09-11 ENCOUNTER — HOSPITAL ENCOUNTER (OUTPATIENT)
Dept: LAB | Age: 54
Discharge: HOME | End: 2024-09-11
Payer: COMMERCIAL

## 2024-09-11 DIAGNOSIS — Z90.79 STATUS POST PROSTATECTOMY: ICD-10-CM

## 2024-09-11 DIAGNOSIS — Z12.5 PROSTATE CANCER SCREENING: ICD-10-CM

## 2024-09-11 DIAGNOSIS — Z11.59 NEED FOR HEPATITIS C SCREENING TEST: ICD-10-CM

## 2024-09-11 DIAGNOSIS — Z00.00 ENCOUNTER FOR WELL ADULT EXAM WITHOUT ABNORMAL FINDINGS: ICD-10-CM

## 2024-09-11 LAB
ALBUMIN SERPL-MCNC: 4.8 G/DL (ref 3.5–4.6)
ALP SERPL-CCNC: 69 U/L (ref 35–104)
ALT SERPL-CCNC: 32 U/L (ref 0–41)
ANION GAP SERPL CALCULATED.3IONS-SCNC: 10 MEQ/L (ref 9–15)
AST SERPL-CCNC: 29 U/L (ref 0–40)
BASOPHILS # BLD: 0 K/UL (ref 0–0.2)
BASOPHILS NFR BLD: 1.2 %
BILIRUB SERPL-MCNC: 0.3 MG/DL (ref 0.2–0.7)
BUN SERPL-MCNC: 26 MG/DL (ref 6–20)
CALCIUM SERPL-MCNC: 9.6 MG/DL (ref 8.5–9.9)
CHLORIDE SERPL-SCNC: 105 MEQ/L (ref 95–107)
CHOLEST SERPL-MCNC: 261 MG/DL (ref 0–199)
CO2 SERPL-SCNC: 28 MEQ/L (ref 20–31)
CREAT SERPL-MCNC: 1 MG/DL (ref 0.7–1.2)
EOSINOPHIL # BLD: 0.1 K/UL (ref 0–0.7)
EOSINOPHIL NFR BLD: 4 %
ERYTHROCYTE [DISTWIDTH] IN BLOOD BY AUTOMATED COUNT: 13.1 % (ref 11.5–14.5)
GLOBULIN SER CALC-MCNC: 2.7 G/DL (ref 2.3–3.5)
GLUCOSE SERPL-MCNC: 83 MG/DL (ref 70–99)
HCT VFR BLD AUTO: 45.2 % (ref 42–52)
HDLC SERPL-MCNC: 66 MG/DL (ref 40–59)
HGB BLD-MCNC: 15 G/DL (ref 14–18)
LDL CHOLESTEROL: 163 MG/DL (ref 0–129)
LYMPHOCYTES # BLD: 1.4 K/UL (ref 1–4.8)
LYMPHOCYTES NFR BLD: 35 %
MCH RBC QN AUTO: 31.8 PG (ref 27–31.3)
MCHC RBC AUTO-ENTMCNC: 33.2 % (ref 33–37)
MCV RBC AUTO: 96 FL (ref 79–92.2)
MONOCYTES # BLD: 0.4 K/UL (ref 0.2–0.8)
MONOCYTES NFR BLD: 12.6 %
NEUTROPHILS # BLD: 1.4 K/UL (ref 1.4–6.5)
NEUTS SEG NFR BLD: 43 %
PLATELET # BLD AUTO: 225 K/UL (ref 130–400)
PLATELET BLD QL SMEAR: ADEQUATE
POTASSIUM SERPL-SCNC: 4.6 MEQ/L (ref 3.4–4.9)
PROT SERPL-MCNC: 7.5 G/DL (ref 6.3–8)
PSA SERPL-MCNC: <0.01 NG/ML (ref 0–4)
RBC # BLD AUTO: 4.71 M/UL (ref 4.7–6.1)
RBC MORPH BLD: NORMAL
SODIUM SERPL-SCNC: 143 MEQ/L (ref 135–144)
TRIGLYCERIDE, FASTING: 161 MG/DL (ref 0–150)
VARIANT LYMPHS NFR BLD: 6 %
WBC # BLD AUTO: 3.3 K/UL (ref 4.8–10.8)

## 2024-09-11 PROCEDURE — 36415 COLL VENOUS BLD VENIPUNCTURE: CPT

## 2024-09-11 PROCEDURE — 80061 LIPID PANEL: CPT

## 2024-09-11 PROCEDURE — 85025 COMPLETE CBC W/AUTO DIFF WBC: CPT

## 2024-09-11 PROCEDURE — 86803 HEPATITIS C AB TEST: CPT

## 2024-09-11 PROCEDURE — 84153 ASSAY OF PSA TOTAL: CPT

## 2024-09-11 PROCEDURE — 80053 COMPREHEN METABOLIC PANEL: CPT

## 2024-09-12 LAB — HEPATITIS C ANTIBODY: NONREACTIVE

## 2024-09-12 ASSESSMENT — ENCOUNTER SYMPTOMS
DIARRHEA: 0
NAUSEA: 0
SHORTNESS OF BREATH: 0
CONSTIPATION: 0
BLOOD IN STOOL: 0
COUGH: 0
CHEST TIGHTNESS: 0
ABDOMINAL PAIN: 1
APNEA: 0
VOMITING: 0

## 2024-09-13 DIAGNOSIS — D72.819 LEUKOPENIA, UNSPECIFIED TYPE: Primary | ICD-10-CM

## 2024-09-17 DIAGNOSIS — Z13.220 LIPID SCREENING: ICD-10-CM

## 2024-09-17 DIAGNOSIS — D72.819 LEUKOPENIA, UNSPECIFIED TYPE: Primary | ICD-10-CM

## 2024-09-17 DIAGNOSIS — E78.5 HYPERLIPIDEMIA, UNSPECIFIED HYPERLIPIDEMIA TYPE: ICD-10-CM

## 2024-09-23 ENCOUNTER — HOSPITAL ENCOUNTER (OUTPATIENT)
Dept: CT IMAGING | Age: 54
Discharge: HOME | End: 2024-09-25
Attending: FAMILY MEDICINE
Payer: COMMERCIAL

## 2024-09-23 ENCOUNTER — HOSPITAL ENCOUNTER (OUTPATIENT)
Dept: ULTRASOUND IMAGING | Age: 54
Discharge: HOME | End: 2024-09-25
Attending: FAMILY MEDICINE
Payer: COMMERCIAL

## 2024-09-23 DIAGNOSIS — N50.811 PAIN IN BOTH TESTICLES: ICD-10-CM

## 2024-09-23 DIAGNOSIS — N50.812 PAIN IN BOTH TESTICLES: ICD-10-CM

## 2024-09-23 DIAGNOSIS — R10.30 LOWER ABDOMINAL PAIN: ICD-10-CM

## 2024-09-23 PROCEDURE — 76870 US EXAM SCROTUM: CPT

## 2024-09-23 PROCEDURE — 74177 CT ABD & PELVIS W/CONTRAST: CPT

## 2024-09-23 PROCEDURE — 6360000004 HC RX CONTRAST MEDICATION: Performed by: FAMILY MEDICINE

## 2024-09-23 RX ORDER — IOPAMIDOL 755 MG/ML
75 INJECTION, SOLUTION INTRAVASCULAR
Status: DISCONTINUED | OUTPATIENT
Start: 2024-09-23 | End: 2024-09-26 | Stop reason: HOSPADM

## 2024-09-23 RX ORDER — IOPAMIDOL 755 MG/ML
18 INJECTION, SOLUTION INTRAVASCULAR
Status: COMPLETED | OUTPATIENT
Start: 2024-09-23 | End: 2024-09-23

## 2024-09-23 RX ADMIN — IOPAMIDOL 18 ML: 755 INJECTION, SOLUTION INTRAVENOUS at 08:21

## 2024-09-26 DIAGNOSIS — K76.9 LIVER LESION: Primary | ICD-10-CM

## 2024-09-27 ENCOUNTER — TELEPHONE (OUTPATIENT)
Dept: FAMILY MEDICINE CLINIC | Age: 54
End: 2024-09-27

## 2024-09-27 NOTE — TELEPHONE ENCOUNTER
Patient's wife called in she states Julio is upset regarding a phone call he received from [facility]. He is asking Dr. Hernandez to look over those results and call him back asap.

## 2024-10-01 ENCOUNTER — OFFICE VISIT (OUTPATIENT)
Dept: GASTROENTEROLOGY | Age: 54
End: 2024-10-01
Payer: COMMERCIAL

## 2024-10-01 VITALS
OXYGEN SATURATION: 97 % | WEIGHT: 210 LBS | DIASTOLIC BLOOD PRESSURE: 90 MMHG | BODY MASS INDEX: 26.96 KG/M2 | SYSTOLIC BLOOD PRESSURE: 160 MMHG | HEART RATE: 86 BPM

## 2024-10-01 DIAGNOSIS — R93.3 ABNORMAL FINDING ON GI TRACT IMAGING: Primary | ICD-10-CM

## 2024-10-01 PROCEDURE — 99204 OFFICE O/P NEW MOD 45 MIN: CPT | Performed by: INTERNAL MEDICINE

## 2024-10-01 PROCEDURE — G8484 FLU IMMUNIZE NO ADMIN: HCPCS | Performed by: INTERNAL MEDICINE

## 2024-10-01 PROCEDURE — 3017F COLORECTAL CA SCREEN DOC REV: CPT | Performed by: INTERNAL MEDICINE

## 2024-10-01 PROCEDURE — 1036F TOBACCO NON-USER: CPT | Performed by: INTERNAL MEDICINE

## 2024-10-01 PROCEDURE — G8419 CALC BMI OUT NRM PARAM NOF/U: HCPCS | Performed by: INTERNAL MEDICINE

## 2024-10-01 PROCEDURE — G8427 DOCREV CUR MEDS BY ELIG CLIN: HCPCS | Performed by: INTERNAL MEDICINE

## 2024-10-01 NOTE — PROGRESS NOTES
Subjective:      Patient ID: JOVANA Melendez is a 54 y.o. male who presents today for:  Chief Complaint   Patient presents with    New Patient       HPI  This is a very pleasant 54-year-old who came in today for further evaluation management.  Patient mentioned that during routine evaluation for scrotal pain, patient had CT scan that showed abnormal CT imaging with liver lesion reported at low-attenuation lesion does not meet CT criteria for simple cyst.  Patient was recommended proceeding with ultrasound and MRI.  Patient denies abdominal pain nausea with weight loss.  No melena or hematochezia.  No prior imaging to compare with.  Patient however mentioned that at some point he was told that he may have hemangioma.  History not clear though no imaging identified from previous.  Patient is also due for surveillance colonoscopy.  Denies change of bowel movements.  Given the abnormal CT imaging, patient came in today for further evaluation management  Past Medical History:   Diagnosis Date    Allergic rhinitis     Cancer (HCC) 2/12    prostate    Hyperlipidemia     borderline    PVC (premature ventricular contraction)     benign     Past Surgical History:   Procedure Laterality Date    COLONOSCOPY N/A 02/19/2020    COLORECTAL CANCER SCREENING, NOT HIGH RISK performed by Vinny Mehta MD at [facility] OR    CYST REMOVAL  2000    buttocks    FINGER SURGERY      left thumb, game keepers    FRACTURE SURGERY  10/15/18    JOINT REPLACEMENT  11/11/2019    KNEE SURGERY      left, meniscal x 2     KNEE SURGERY Left 10/29/2020    partial left knee replacement    PROSTATECTOMY  02/2012    cancer    TONSILLECTOMY  age 4     Social History     Socioeconomic History    Marital status:      Spouse name: Not on file    Number of children: Not on file    Years of education: Not on file    Highest education level: Not on file   Occupational History    Not on file   Tobacco Use    Smoking status: Never    Smokeless tobacco: Former

## 2024-10-02 ASSESSMENT — ENCOUNTER SYMPTOMS
VOMITING: 0
VOICE CHANGE: 0
COLOR CHANGE: 0
EYE PAIN: 0
ABDOMINAL PAIN: 0
NAUSEA: 0
BLOOD IN STOOL: 0
CONSTIPATION: 0
ABDOMINAL DISTENTION: 0
EYE REDNESS: 0
DIARRHEA: 0
PHOTOPHOBIA: 0
CHEST TIGHTNESS: 0
TROUBLE SWALLOWING: 0
SHORTNESS OF BREATH: 0
WHEEZING: 0
RECTAL PAIN: 0

## 2024-10-17 ENCOUNTER — HOSPITAL ENCOUNTER (OUTPATIENT)
Dept: ULTRASOUND IMAGING | Age: 54
Discharge: HOME | End: 2024-10-19
Payer: COMMERCIAL

## 2024-10-17 DIAGNOSIS — K76.9 LIVER LESION: ICD-10-CM

## 2024-10-17 PROCEDURE — 76705 ECHO EXAM OF ABDOMEN: CPT

## (undated) DEVICE — GOWN,AURORA,NONRNF,XL,30/CS: Brand: MEDLINE

## (undated) DEVICE — TRAYS TRANSPORT SCOPE OASIS W/LID

## (undated) DEVICE — CATHETER SCLERO L240CM NDL 25GA L4MM SHTH DIA2.3MM CNTRST

## (undated) DEVICE — 2000CC GUARDIAN II: Brand: GUARDIAN

## (undated) DEVICE — ADAPTER FLSH PMP FLD MGMT GI IRRIG OFP 2 DISPOSABLE

## (undated) DEVICE — SNARE ENDOSCP AD L240CM LOOP W10MM SHTH DIA2.4MM RND INSUL

## (undated) DEVICE — TUBE ENDOSCP COLON CHANNEL

## (undated) DEVICE — MEDI-VAC NON-CONDUCTIVE SUCTION TUBING: Brand: CARDINAL HEALTH

## (undated) DEVICE — TRAP POLYP BALEEN

## (undated) DEVICE — ENDO CARRY-ON PROCEDURE KIT INCLUDES LUBRICANT, DEFENDO OLYMPUS AIR, WATER, SUCTION, BIOPSY VALVE KIT, ENZYMATIC SPONGE, AND BASIN.: Brand: ENDO CARRY-ON PROCEDURE KIT

## (undated) DEVICE — TUBE SET 96 MM 64 MM H2O PERISTALTIC STD AUX CHANNEL

## (undated) DEVICE — 4-PORT MANIFOLD: Brand: NEPTUNE 2

## (undated) DEVICE — GLOVE ORANGE PI 8 1/2   MSG9085

## (undated) DEVICE — Device: Brand: ENDO SMARTCAP